# Patient Record
Sex: MALE | Race: WHITE | Employment: OTHER | URBAN - METROPOLITAN AREA
[De-identification: names, ages, dates, MRNs, and addresses within clinical notes are randomized per-mention and may not be internally consistent; named-entity substitution may affect disease eponyms.]

---

## 2017-02-23 ENCOUNTER — HOSPITAL ENCOUNTER (OUTPATIENT)
Facility: HOSPITAL | Age: 82
Setting detail: OBSERVATION
Discharge: HOME HEALTH | End: 2017-02-25
Attending: EMERGENCY MEDICINE | Admitting: HOSPITALIST
Payer: MEDICARE

## 2017-02-23 ENCOUNTER — APPOINTMENT (OUTPATIENT)
Dept: ULTRASOUND IMAGING | Facility: HOSPITAL | Age: 82
End: 2017-02-23
Attending: EMERGENCY MEDICINE
Payer: MEDICARE

## 2017-02-23 ENCOUNTER — APPOINTMENT (OUTPATIENT)
Dept: GENERAL RADIOLOGY | Facility: HOSPITAL | Age: 82
End: 2017-02-23
Attending: EMERGENCY MEDICINE
Payer: MEDICARE

## 2017-02-23 DIAGNOSIS — R50.9 FEVER, UNSPECIFIED FEVER CAUSE: ICD-10-CM

## 2017-02-23 DIAGNOSIS — R53.1 WEAKNESS GENERALIZED: Primary | ICD-10-CM

## 2017-02-23 LAB
ALBUMIN SERPL-MCNC: 3.5 G/DL (ref 3.5–4.8)
ALP LIVER SERPL-CCNC: 95 U/L (ref 45–117)
ALT SERPL-CCNC: 13 U/L (ref 17–63)
AST SERPL-CCNC: 20 U/L (ref 15–41)
ATRIAL RATE: 102 BPM
BASOPHILS # BLD AUTO: 0.03 X10(3) UL (ref 0–0.1)
BASOPHILS NFR BLD AUTO: 0.4 %
BILIRUB SERPL-MCNC: 0.7 MG/DL (ref 0.1–2)
BILIRUB UR QL STRIP.AUTO: NEGATIVE
BUN BLD-MCNC: 27 MG/DL (ref 8–20)
CALCIUM BLD-MCNC: 9.2 MG/DL (ref 8.3–10.3)
CHLORIDE: 109 MMOL/L (ref 101–111)
CLARITY UR REFRACT.AUTO: CLEAR
CO2: 26 MMOL/L (ref 22–32)
COLOR UR AUTO: YELLOW
CREAT BLD-MCNC: 1.85 MG/DL (ref 0.7–1.3)
EOSINOPHIL # BLD AUTO: 0.01 X10(3) UL (ref 0–0.3)
EOSINOPHIL NFR BLD AUTO: 0.1 %
ERYTHROCYTE [DISTWIDTH] IN BLOOD BY AUTOMATED COUNT: 12.8 % (ref 11.5–16)
GLUCOSE BLD-MCNC: 114 MG/DL (ref 70–99)
GLUCOSE UR STRIP.AUTO-MCNC: NEGATIVE MG/DL
HCT VFR BLD AUTO: 41.8 % (ref 37–53)
HGB BLD-MCNC: 14.9 G/DL (ref 13–17)
IMMATURE GRANULOCYTE COUNT: 0.02 X10(3) UL (ref 0–1)
IMMATURE GRANULOCYTE RATIO %: 0.3 %
KETONES UR STRIP.AUTO-MCNC: NEGATIVE MG/DL
LEUKOCYTE ESTERASE UR QL STRIP.AUTO: NEGATIVE
LYMPHOCYTES # BLD AUTO: 0.3 X10(3) UL (ref 0.9–4)
LYMPHOCYTES NFR BLD AUTO: 4.4 %
M PROTEIN MFR SERPL ELPH: 7.5 G/DL (ref 6.1–8.3)
MCH RBC QN AUTO: 30.9 PG (ref 27–33.2)
MCHC RBC AUTO-ENTMCNC: 35.6 G/DL (ref 31–37)
MCV RBC AUTO: 86.7 FL (ref 80–99)
MONOCYTES # BLD AUTO: 0.34 X10(3) UL (ref 0.1–0.6)
MONOCYTES NFR BLD AUTO: 5 %
NEUTROPHIL ABS PRELIM: 6.09 X10 (3) UL (ref 1.3–6.7)
NEUTROPHILS # BLD AUTO: 6.09 X10(3) UL (ref 1.3–6.7)
NEUTROPHILS NFR BLD AUTO: 89.8 %
NITRITE UR QL STRIP.AUTO: NEGATIVE
P-R INTERVAL: 136 MS
PH UR STRIP.AUTO: 5 [PH] (ref 4.5–8)
PLATELET # BLD AUTO: 138 10(3)UL (ref 150–450)
POTASSIUM SERPL-SCNC: 4.6 MMOL/L (ref 3.6–5.1)
PROT UR STRIP.AUTO-MCNC: 30 MG/DL
Q-T INTERVAL: 330 MS
QRS DURATION: 86 MS
QTC CALCULATION (BEZET): 430 MS
R AXIS: -73 DEGREES
RBC # BLD AUTO: 4.82 X10(6)UL (ref 3.8–5.8)
RBC UR QL AUTO: NEGATIVE
RED CELL DISTRIBUTION WIDTH-SD: 39.6 FL (ref 35.1–46.3)
SODIUM SERPL-SCNC: 141 MMOL/L (ref 136–144)
SP GR UR STRIP.AUTO: 1.02 (ref 1–1.03)
T AXIS: 14 DEGREES
UROBILINOGEN UR STRIP.AUTO-MCNC: <2 MG/DL
VENTRICULAR RATE: 102 BPM
WBC # BLD AUTO: 6.8 X10(3) UL (ref 4–13)

## 2017-02-23 PROCEDURE — 93975 VASCULAR STUDY: CPT

## 2017-02-23 PROCEDURE — 71010 XR CHEST AP PORTABLE  (CPT=71010): CPT

## 2017-02-23 PROCEDURE — 76870 US EXAM SCROTUM: CPT

## 2017-02-23 PROCEDURE — 99220 INITIAL OBSERVATION CARE,LEVL III: CPT | Performed by: HOSPITALIST

## 2017-02-23 RX ORDER — AMLODIPINE BESYLATE 2.5 MG/1
2.5 TABLET ORAL DAILY
Status: DISCONTINUED | OUTPATIENT
Start: 2017-02-23 | End: 2017-02-25

## 2017-02-23 RX ORDER — ACETAMINOPHEN 325 MG/1
650 TABLET ORAL EVERY 6 HOURS PRN
Status: DISCONTINUED | OUTPATIENT
Start: 2017-02-23 | End: 2017-02-25

## 2017-02-23 RX ORDER — PRAVASTATIN SODIUM 20 MG
20 TABLET ORAL DAILY
COMMUNITY

## 2017-02-23 RX ORDER — ASPIRIN 325 MG
325 TABLET ORAL DAILY
Status: DISCONTINUED | OUTPATIENT
Start: 2017-02-23 | End: 2017-02-25

## 2017-02-23 RX ORDER — TRAZODONE HYDROCHLORIDE 50 MG/1
50 TABLET ORAL NIGHTLY PRN
Status: DISCONTINUED | OUTPATIENT
Start: 2017-02-23 | End: 2017-02-25

## 2017-02-23 RX ORDER — PRAVASTATIN SODIUM 20 MG
20 TABLET ORAL NIGHTLY
Status: DISCONTINUED | OUTPATIENT
Start: 2017-02-23 | End: 2017-02-25

## 2017-02-23 RX ORDER — HEPARIN SODIUM 5000 [USP'U]/ML
5000 INJECTION, SOLUTION INTRAVENOUS; SUBCUTANEOUS EVERY 8 HOURS
Status: DISCONTINUED | OUTPATIENT
Start: 2017-02-23 | End: 2017-02-25

## 2017-02-23 RX ORDER — ASPIRIN 81 MG/1
81 TABLET, CHEWABLE ORAL DAILY
COMMUNITY

## 2017-02-23 RX ORDER — SODIUM CHLORIDE 9 MG/ML
INJECTION, SOLUTION INTRAVENOUS CONTINUOUS
Status: DISCONTINUED | OUTPATIENT
Start: 2017-02-23 | End: 2017-02-24

## 2017-02-23 RX ORDER — ONDANSETRON 2 MG/ML
8 INJECTION INTRAMUSCULAR; INTRAVENOUS EVERY 6 HOURS PRN
Status: DISCONTINUED | OUTPATIENT
Start: 2017-02-23 | End: 2017-02-25

## 2017-02-23 RX ORDER — MONTELUKAST SODIUM 4 MG/1
1 TABLET, CHEWABLE ORAL DAILY
COMMUNITY
End: 2020-11-04

## 2017-02-23 NOTE — ED NOTES
Pt was able to void while down in US. Urine specimen obtained. Order for straight cath d/c'd at this time.

## 2017-02-23 NOTE — ED NOTES
Pt in [address] at this time and exam has not started. RN to go to [address] department to draw blood at this time in order to expedite care.

## 2017-02-23 NOTE — ED INITIAL ASSESSMENT (HPI)
Patient woke up this morning confused, not knowing what day it was, or his schedule. He has been increasingly fatigued today, unable to get out of chair and transition from wheelchair.  Family states patient can be confused but this is more confusion than

## 2017-02-23 NOTE — H&P
[facility] HOSPITALIST  History and Physical     Jose Chang Patient Status:  Emergency    1926 MRN [id number]   Location [address] [facility] Attending Winsome Mcmillan MD   Hosp Day # 0 PCP Iline Felty, MD     Chief Compl Review of Systems:   A comprehensive 14 point review of systems was completed. Pertinent positives and negatives noted in the HPI.     Physical Exam:    /81 mmHg  Pulse 102  Temp(Src) 100.1 °F (37.8 °C) (Temporal)  Resp 24  Ht 5' 2\" (1.575 baseline  6. Thrombocytopenia-monitor    Will f/u pending studies and provide addendum.     Quality:  · DVT Prophylaxis: heparin  · CODE status: full  · Leo: no    Plan of care discussed with patient and er md Eusebio Centeno MD  2/23/2017    Addendum:

## 2017-02-23 NOTE — ED NOTES
Pt assisted onto cart at this time by RN. Per POA at bedside pt normally has some confusion, and pt has developed a fever and lethargy today. Per POA, pt is normally somewhat difficult to understand with speech.  Pt awake, following simple commands appropri

## 2017-02-23 NOTE — ED PROVIDER NOTES
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 There is a 4.9 cm cyst superior to the left see corresponding to patient's palpable finding. Appropriate clinical followup is advised.     Dictated by: Feng Jnenings MD on 2/23/2017 at 16:13     Approved by: Feng Jennings MD              Chest x-ra

## 2017-02-24 PROCEDURE — 99225 SUBSEQUENT OBSERVATION CARE: CPT | Performed by: HOSPITALIST

## 2017-02-24 NOTE — PROGRESS NOTES
NURSING ADMISSION NOTE      Patient admitted via Cart  Oriented to room. Safety precautions initiated. Bed in low position. Call light in reach. Admission navigator completed.

## 2017-02-24 NOTE — PHYSICAL THERAPY NOTE
PHYSICAL THERAPY EVALUATION - INPATIENT     Room Number: 424/424-A  Evaluation Date: 2/24/2017  Type of Evaluation: Initial  Physician Order: PT Eval and Treat    Presenting Problem: generalized weakness  Reason for Therapy: Mobility Dysfunction and Cathern Shane risk    WEIGHT BEARING RESTRICTION  Weight Bearing Restriction: None                PAIN ASSESSMENT  Ratin          COGNITION  · Overall Cognitive Status:  Impaired  · Orientation Level:  oriented to place, oriented to person and identified hospital, n (G-Code): CK    FUNCTIONAL ABILITY STATUS  Gait Assessment   Gait Assistance: Minimum assistance (Actual CGA)  Distance (ft): 150  Assistive Device: Rolling walker  Pattern: Shuffle  Stoop/Curb Assistance: Not tested    Skilled Therapy Provided: Patient wa mobility; Body mechanics; Patient education; Energy conservation; Endurance; Family education;Gait training;Balance training;Transfer training  Rehab Potential : Good  Frequency (Obs): 5x/week  Number of Visits to Meet Established Goals: 5      CURRENT GOALS

## 2017-02-24 NOTE — CM/SW NOTE
Vannessa Charles, 02/24/2017, 4:35 PM  Pt. Received and signed the Evangelina Rodriguez 130 letter. Copy placed in his chart.

## 2017-02-24 NOTE — PROGRESS NOTES
[facility] H[facility]PITAL[facility]  Progress Note     Juan M Dixon Patient Status:  Observation    1926 MRN [id number]   [facility] 4NW-A Attending Apolonia Oliveira MD   Hosp Day # 1 PCP Jair Sandoval MD     Chief Complaint: Travis Plane: TME  7. CKD    Plan of care: PT eval, await cultures    Quality:  · DVT Prophylaxis: Heparin  · CODE status: Full Code  · Leo: None  · Central line: None    Estimated date of discharge:  Tomorrow  Discharge is dependent on: Progress  At this point Mr. Jose Duran

## 2017-02-24 NOTE — CM/SW NOTE
02/24/17 1600   CM/SW Referral Data   Referral Source Physician;Social Work (self-referral)   Reason for Referral Discharge planning   Informant Other  (POA, Chart)   Pertinent Medical Hx   Primary Care Physician Name Dmitry Enrique   Patient Info   Patient's

## 2017-02-24 NOTE — PLAN OF CARE
Achieve highest/safest level of mobility/gait Progressing     Pt working with physical therapy. Using rolling walker with therapy. Recommending 24/7 care. Pt AOx2. Forgetful. VSS. Adequate appetite.  Pt lives in [facility], taken care of by nurse who wo

## 2017-02-25 VITALS
BODY MASS INDEX: 28.52 KG/M2 | SYSTOLIC BLOOD PRESSURE: 158 MMHG | OXYGEN SATURATION: 95 % | WEIGHT: 155 LBS | DIASTOLIC BLOOD PRESSURE: 66 MMHG | HEIGHT: 62 IN | HEART RATE: 56 BPM | TEMPERATURE: 97 F | RESPIRATION RATE: 20 BRPM

## 2017-02-25 LAB — RESPIRATORY PANEL NEG:: NEGATIVE

## 2017-02-25 PROCEDURE — 99217 OBSERVATION CARE DISCHARGE: CPT | Performed by: HOSPITALIST

## 2017-02-25 NOTE — CM/SW NOTE
MARCO spoke w/BETSEY López who stated he is not sure why the pt is told he needs 24/7 care. POA asked if pt was able to walk. Informed him per pt's note on 2/24 the pt ambulated 150ft. Jc stated the pt's \"been confused. \" Informed him PT/OT will see the pt

## 2017-02-25 NOTE — PLAN OF CARE
Pt is alert and oriented x 2, forgetful most of the times than confusion, Very occasional confusion noted. Walked to the bathroom with assistance. Pt is unsteady, Follows commands appropriately. No C/o pain or discomfort. Vitals stable.  Fall precautions ob

## 2017-02-25 NOTE — CM/SW NOTE
02/25/17 1500   Discharge disposition   Discharged to: Home or Self   Name of Facillity/Home Care/Hospice Residential   Discharge transportation Private car   SW spoke w/POA who stated he would still like to take the pt home.  Jc confirmed nursing st

## 2017-02-25 NOTE — PHYSICAL THERAPY NOTE
PHYSICAL THERAPY TREATMENT NOTE - INPATIENT    Room Number: 424/424-A     Session: 1/5   Number of Visits to Meet Established Goals: 5    Presenting Problem: generalized weakness    Problem List  Principal Problem:    Weakness generalized  Active Problems to a chair (including a wheelchair)?: A Little   -   Need to walk in hospital room?: A Little   -   Climbing 3-5 steps with a railing?: A Lot       AM-PAC Score:  Raw Score: 19   PT Approx Degree of Impairment Score: 41.77%   Standardized Score (AM-PAC Sca when medically appropriate. DISCHARGE RECOMMENDATIONS  PT Discharge Recommendations: 24 hour care/supervision     PLAN  PT Treatment Plan: Bed mobility; Body mechanics; Patient education; Energy conservation; Endurance; Family education;Gait training;Balanc

## 2017-02-25 NOTE — PROGRESS NOTES
Patient is alert,assisted up to bathroom,No complaint of pain. On droplet precautions,awaiting  results of flu panel.

## 2017-02-25 NOTE — OCCUPATIONAL THERAPY NOTE
OCCUPATIONAL THERAPY QUICK EVALUATION - INPATIENT    Room Number: 424/424-A  Evaluation Date: 2/25/2017     Type of Evaluation: Quick Eval  Presenting Problem: weakness    Physician Order: IP Consult to Occupational Therapy  Reason for Therapy:  ADL/IADL D RESTRICTION  Weight Bearing Restriction: None                PAIN ASSESSMENT  Ratin          COGNITION  Per Short Blessed Test, Pt scored 20/28 indicating \"Impairment consistent with dementia\"  Ox2 can follow simple commands for tx    RANGE OF MOTION Per Short Blessed Test, Pt scored 20/28 indicating \"Impairment consistent with dementia\"    OT Discharge Recommendations: 24 hour care/supervision  OT Device Recommendations: None    PLAN  Patient has been evaluated and presents with no skilled Occupatio

## 2017-02-25 NOTE — PROGRESS NOTES
[facility] H[facility]PITAL[facility]  Progress Note     [facility] Patient Status:  Observation    1926 MRN [id number]   [facility] 4NW-A Attending Areli Yung MD   Hosp Day # 2 PCP Juan M Ríos MD     Chief Complaint: Barbie Bare: up  4. HTN  5. HL  6. Acute encephalopathy:  Stable  7. CKD    Plan of care: Placement    Quality:  · DVT Prophylaxis: Heparin  · CODE status: Full Code  · Leo: None  · Central line: None    Estimated date of discharge:  Today  Discharge is dependent on:

## 2017-02-26 NOTE — DISCHARGE SUMMARY
[facility] HOSPITALIST  DISCHARGE SUMMARY     [facility] Patient Status:  Observation    1926 MRN [id number]   [facility] 4NW-A Attending No att. providers found   Hosp Day # 2 PCP Duong Mack MD     Date of Admission:  CONTINUE taking these medications       Instructions Prescription details    aspirin 325 MG Tabs   Last time this was given:  325 mg on 2/25/2017 10:06 AM        Take 325 mg by mouth daily.     Refills:  0       Colestipol HCl 1 g Tabs   Commonly known as

## 2017-02-26 NOTE — PLAN OF CARE
NURSING DISCHARGE NOTE    Discharged Home-holly home via Wheelchair. Accompanied by Support staff  Belongings Taken by patient/family. Discharge instructions given to [address] [place] [address].

## 2017-02-27 ENCOUNTER — TELEPHONE (OUTPATIENT)
Dept: FAMILY MEDICINE CLINIC | Facility: CLINIC | Age: 82
End: 2017-02-27

## 2017-03-02 ENCOUNTER — TELEPHONE (OUTPATIENT)
Dept: FAMILY MEDICINE CLINIC | Facility: CLINIC | Age: 82
End: 2017-03-02

## 2017-03-02 NOTE — TELEPHONE ENCOUNTER
Filomena from [employer] called giving an update on pt. He was a non admit after hospitalization. After evaluation they did not feel that home healthcare was needed at this time.

## 2017-08-03 ENCOUNTER — TELEPHONE (OUTPATIENT)
Dept: FAMILY MEDICINE CLINIC | Facility: CLINIC | Age: 82
End: 2017-08-03

## 2017-08-07 ENCOUNTER — PRIOR ORIGINAL RECORDS (OUTPATIENT)
Dept: OTHER | Age: 82
End: 2017-08-07

## 2018-01-10 ENCOUNTER — PRIOR ORIGINAL RECORDS (OUTPATIENT)
Dept: OTHER | Age: 83
End: 2018-01-10

## 2018-01-26 ENCOUNTER — PRIOR ORIGINAL RECORDS (OUTPATIENT)
Dept: OTHER | Age: 83
End: 2018-01-26

## 2018-01-29 LAB
ALBUMIN: 3.7 G/DL
ALKALINE PHOSPHATATE(ALK PHOS): 81 IU/L
BILIRUBIN TOTAL: 0.5 MG/DL
BUN: 33 MG/DL
CALCIUM: 9.4 MG/DL
CHLORIDE: 111 MEQ/L
CHOLESTEROL, TOTAL: 156 MG/DL
CREATININE, SERUM: 1.78 MG/DL
GLOBULIN: 2.8 G/DL
GLUCOSE: 98 MG/DL
HDL CHOLESTEROL: 47 MG/DL
HEMATOCRIT: 35.6 %
HEMOGLOBIN: 11.9 G/DL
LDL CHOLESTEROL: 83 MG/DL
PLATELETS: 145 K/UL
POTASSIUM, SERUM: 4.6 MEQ/L
PROTEIN, TOTAL: 6.5 G/DL
RED BLOOD COUNT: 4.02 X 10-6/U
SGOT (AST): 19 IU/L
SGPT (ALT): 7 IU/L
SODIUM: 143 MEQ/L
THYROID STIMULATING HORMONE: 3.72 MLU/L
TRIGLYCERIDES: 156 MG/DL
WHITE BLOOD COUNT: 5.2 X 10-3/U

## 2018-04-30 ENCOUNTER — PRIOR ORIGINAL RECORDS (OUTPATIENT)
Dept: OTHER | Age: 83
End: 2018-04-30

## 2018-11-12 ENCOUNTER — MYAURORA ACCOUNT LINK (OUTPATIENT)
Dept: OTHER | Age: 83
End: 2018-11-12

## 2018-11-12 ENCOUNTER — PRIOR ORIGINAL RECORDS (OUTPATIENT)
Dept: OTHER | Age: 83
End: 2018-11-12

## 2019-02-07 ENCOUNTER — PRIOR ORIGINAL RECORDS (OUTPATIENT)
Dept: OTHER | Age: 84
End: 2019-02-07

## 2019-02-08 LAB
CHOLESTEROL, TOTAL: 163 MG/DL
HDL CHOLESTEROL: 49 MG/DL
LDL CHOLESTEROL: 89 MG/DL
TRIGLYCERIDES: 148 MG/DL

## 2019-02-12 ENCOUNTER — PRIOR ORIGINAL RECORDS (OUTPATIENT)
Dept: OTHER | Age: 84
End: 2019-02-12

## 2019-02-13 ENCOUNTER — PRIOR ORIGINAL RECORDS (OUTPATIENT)
Dept: OTHER | Age: 84
End: 2019-02-13

## 2019-02-28 VITALS
DIASTOLIC BLOOD PRESSURE: 60 MMHG | HEART RATE: 57 BPM | SYSTOLIC BLOOD PRESSURE: 128 MMHG | BODY MASS INDEX: 22.26 KG/M2 | WEIGHT: 121 LBS | HEIGHT: 62 IN

## 2019-02-28 VITALS
HEIGHT: 62 IN | WEIGHT: 129 LBS | BODY MASS INDEX: 23.74 KG/M2 | HEART RATE: 58 BPM | DIASTOLIC BLOOD PRESSURE: 62 MMHG | SYSTOLIC BLOOD PRESSURE: 120 MMHG

## 2019-02-28 VITALS
HEIGHT: 62 IN | SYSTOLIC BLOOD PRESSURE: 126 MMHG | BODY MASS INDEX: 21.35 KG/M2 | DIASTOLIC BLOOD PRESSURE: 60 MMHG | WEIGHT: 116 LBS | HEART RATE: 60 BPM

## 2019-03-27 RX ORDER — AMLODIPINE BESYLATE 2.5 MG/1
1 TABLET ORAL DAILY
COMMUNITY
Start: 2018-11-09 | End: 2019-11-26 | Stop reason: SDUPTHER

## 2019-03-27 RX ORDER — PRAVASTATIN SODIUM 20 MG
TABLET ORAL
COMMUNITY
End: 2020-02-12 | Stop reason: SDUPTHER

## 2019-03-27 RX ORDER — ASPIRIN 325 MG
TABLET ORAL
COMMUNITY

## 2019-03-27 RX ORDER — MONTELUKAST SODIUM 4 MG/1
TABLET, CHEWABLE ORAL
COMMUNITY
Start: 2018-11-09 | End: 2019-05-07 | Stop reason: ALTCHOICE

## 2019-05-03 ENCOUNTER — TELEPHONE (OUTPATIENT)
Dept: CARDIOLOGY | Age: 84
End: 2019-05-03

## 2019-05-06 ENCOUNTER — TELEPHONE (OUTPATIENT)
Dept: CARDIOLOGY | Age: 84
End: 2019-05-06

## 2019-05-20 ENCOUNTER — OFFICE VISIT (OUTPATIENT)
Dept: CARDIOLOGY | Age: 84
End: 2019-05-20

## 2019-05-20 VITALS
DIASTOLIC BLOOD PRESSURE: 60 MMHG | WEIGHT: 118 LBS | HEIGHT: 60 IN | SYSTOLIC BLOOD PRESSURE: 128 MMHG | BODY MASS INDEX: 23.16 KG/M2 | HEART RATE: 62 BPM

## 2019-05-20 DIAGNOSIS — I25.10 CORONARY ARTERY DISEASE INVOLVING NATIVE CORONARY ARTERY OF NATIVE HEART WITHOUT ANGINA PECTORIS: Primary | ICD-10-CM

## 2019-05-20 DIAGNOSIS — Z95.1 HX OF CABG: ICD-10-CM

## 2019-05-20 DIAGNOSIS — E78.00 PURE HYPERCHOLESTEROLEMIA: ICD-10-CM

## 2019-05-20 DIAGNOSIS — I10 HYPERTENSION, BENIGN: ICD-10-CM

## 2019-05-20 DIAGNOSIS — I65.23 ASYMPTOMATIC CAROTID ARTERY STENOSIS, BILATERAL: ICD-10-CM

## 2019-05-20 PROCEDURE — 99214 OFFICE O/P EST MOD 30 MIN: CPT | Performed by: INTERNAL MEDICINE

## 2019-05-20 SDOH — HEALTH STABILITY: MENTAL HEALTH: HOW OFTEN DO YOU HAVE A DRINK CONTAINING ALCOHOL?: NEVER

## 2019-11-04 ENCOUNTER — DOCUMENTATION (OUTPATIENT)
Dept: CARDIOLOGY | Age: 84
End: 2019-11-04

## 2019-11-05 ENCOUNTER — DOCUMENTATION (OUTPATIENT)
Dept: CARDIOLOGY | Age: 84
End: 2019-11-05

## 2019-11-25 ENCOUNTER — OFFICE VISIT (OUTPATIENT)
Dept: CARDIOLOGY | Age: 84
End: 2019-11-25

## 2019-11-25 VITALS
HEIGHT: 60 IN | BODY MASS INDEX: 22.78 KG/M2 | DIASTOLIC BLOOD PRESSURE: 80 MMHG | SYSTOLIC BLOOD PRESSURE: 132 MMHG | WEIGHT: 116 LBS | HEART RATE: 70 BPM

## 2019-11-25 DIAGNOSIS — I65.23 ASYMPTOMATIC CAROTID ARTERY STENOSIS, BILATERAL: ICD-10-CM

## 2019-11-25 DIAGNOSIS — Z95.1 HX OF CABG: ICD-10-CM

## 2019-11-25 DIAGNOSIS — I25.10 CORONARY ARTERY DISEASE INVOLVING NATIVE CORONARY ARTERY OF NATIVE HEART WITHOUT ANGINA PECTORIS: Primary | ICD-10-CM

## 2019-11-25 DIAGNOSIS — E78.00 PURE HYPERCHOLESTEROLEMIA: ICD-10-CM

## 2019-11-25 DIAGNOSIS — I10 HYPERTENSION, BENIGN: ICD-10-CM

## 2019-11-25 PROCEDURE — 99214 OFFICE O/P EST MOD 30 MIN: CPT | Performed by: INTERNAL MEDICINE

## 2019-11-25 SDOH — HEALTH STABILITY: PHYSICAL HEALTH: ON AVERAGE, HOW MANY DAYS PER WEEK DO YOU ENGAGE IN MODERATE TO STRENUOUS EXERCISE (LIKE A BRISK WALK)?: 0 DAYS

## 2019-11-25 SDOH — HEALTH STABILITY: PHYSICAL HEALTH: ON AVERAGE, HOW MANY MINUTES DO YOU ENGAGE IN EXERCISE AT THIS LEVEL?: 0 MIN

## 2019-11-25 SDOH — HEALTH STABILITY: MENTAL HEALTH: HOW OFTEN DO YOU HAVE A DRINK CONTAINING ALCOHOL?: NEVER

## 2019-11-25 ASSESSMENT — PATIENT HEALTH QUESTIONNAIRE - PHQ9
1. LITTLE INTEREST OR PLEASURE IN DOING THINGS: NOT AT ALL
2. FEELING DOWN, DEPRESSED OR HOPELESS: NOT AT ALL
SUM OF ALL RESPONSES TO PHQ9 QUESTIONS 1 AND 2: 0
SUM OF ALL RESPONSES TO PHQ9 QUESTIONS 1 AND 2: 0

## 2019-11-26 RX ORDER — AMLODIPINE BESYLATE 2.5 MG/1
2.5 TABLET ORAL DAILY
Qty: 90 TABLET | Refills: 3 | Status: SHIPPED | OUTPATIENT
Start: 2019-11-26 | End: 2019-11-29 | Stop reason: SDUPTHER

## 2019-11-29 RX ORDER — AMLODIPINE BESYLATE 2.5 MG/1
TABLET ORAL
Qty: 90 TABLET | Refills: 1 | Status: SHIPPED | OUTPATIENT
Start: 2019-11-29

## 2020-02-13 RX ORDER — PRAVASTATIN SODIUM 20 MG
TABLET ORAL
Qty: 90 TABLET | Refills: 3 | Status: SHIPPED | OUTPATIENT
Start: 2020-02-13

## 2020-02-15 LAB
ALBUMIN SERPL-MCNC: 4 G/DL (ref 3.6–5.1)
ALBUMIN/GLOB SERPL: 1.4 (CALC) (ref 1–2.5)
ALP SERPL-CCNC: 68 U/L (ref 35–144)
ALT SERPL-CCNC: 9 U/L (ref 9–46)
AST SERPL-CCNC: 25 U/L (ref 10–35)
BASOPHILS # BLD AUTO: 38 CELLS/UL (ref 0–200)
BASOPHILS NFR BLD AUTO: 0.8 %
BILIRUB SERPL-MCNC: 0.6 MG/DL (ref 0.2–1.2)
BUN SERPL-MCNC: 40 MG/DL (ref 7–25)
BUN/CREAT SERPL: 23 (CALC) (ref 6–22)
CALCIUM SERPL-MCNC: 9.7 MG/DL (ref 8.6–10.3)
CHLORIDE SERPL-SCNC: 107 MMOL/L (ref 98–110)
CHOLEST SERPL-MCNC: 159 MG/DL
CHOLEST/HDLC SERPL: 2.9 (CALC)
CO2 SERPL-SCNC: 28 MMOL/L (ref 20–32)
CREAT SERPL-MCNC: 1.77 MG/DL (ref 0.7–1.11)
EOSINOPHIL # BLD AUTO: 168 CELLS/UL (ref 15–500)
EOSINOPHIL NFR BLD AUTO: 3.5 %
ERYTHROCYTE [DISTWIDTH] IN BLOOD BY AUTOMATED COUNT: 13.5 % (ref 11–15)
GFRSERPLBLD MDRD-ARVRAT: 32 ML/MIN/1.73M2
GLOBULIN SER CALC-MCNC: 2.9 G/DL (CALC) (ref 1.9–3.7)
GLUCOSE SERPL-MCNC: 89 MG/DL (ref 65–99)
HCT VFR BLD AUTO: 36.6 % (ref 38.5–50)
HDLC SERPL-MCNC: 55 MG/DL
HGB BLD-MCNC: 12.5 G/DL (ref 13.2–17.1)
LDLC SERPL CALC-MCNC: 85 MG/DL (CALC)
LYMPHOCYTES # BLD AUTO: 1138 CELLS/UL (ref 850–3900)
LYMPHOCYTES NFR BLD AUTO: 23.7 %
MCH RBC QN AUTO: 30.9 PG (ref 27–33)
MCHC RBC AUTO-ENTMCNC: 34.2 G/DL (ref 32–36)
MCV RBC AUTO: 90.6 FL (ref 80–100)
MONOCYTES # BLD AUTO: 456 CELLS/UL (ref 200–950)
MONOCYTES NFR BLD AUTO: 9.5 %
NEUTROPHILS # BLD AUTO: 3000 CELLS/UL (ref 1500–7800)
NEUTROPHILS NFR BLD AUTO: 62.5 %
NONHDLC SERPL-MCNC: 104 MG/DL (CALC)
PLATELET # BLD AUTO: 155 THOUSAND/UL (ref 140–400)
PMV BLD REES-ECKER: 10 FL (ref 7.5–12.5)
POTASSIUM SERPL-SCNC: 4.8 MMOL/L (ref 3.5–5.3)
PROT SERPL-MCNC: 6.9 G/DL (ref 6.1–8.1)
RBC # BLD AUTO: 4.04 MILLION/UL (ref 4.2–5.8)
SODIUM SERPL-SCNC: 143 MMOL/L (ref 135–146)
TRIGL SERPL-MCNC: 98 MG/DL
WBC # BLD AUTO: 4.8 THOUSAND/UL (ref 3.8–10.8)

## 2020-04-03 ENCOUNTER — DOCUMENTATION (OUTPATIENT)
Dept: CARDIOLOGY | Age: 85
End: 2020-04-03

## 2020-07-25 ENCOUNTER — HOSPITAL ENCOUNTER (OUTPATIENT)
Age: 85
Discharge: HOME | End: 2020-07-25
Payer: MEDICARE

## 2020-07-25 VITALS
TEMPERATURE: 98 F | BODY MASS INDEX: 28 KG/M2 | DIASTOLIC BLOOD PRESSURE: 72 MMHG | OXYGEN SATURATION: 98 % | WEIGHT: 155 LBS | HEART RATE: 84 BPM | RESPIRATION RATE: 16 BRPM | SYSTOLIC BLOOD PRESSURE: 126 MMHG

## 2020-07-25 DIAGNOSIS — Z20.822 ENCOUNTER FOR SCREENING LABORATORY TESTING FOR COVID-19 VIRUS IN ASYMPTOMATIC PATIENT: Primary | ICD-10-CM

## 2020-07-25 PROCEDURE — 99202 OFFICE O/P NEW SF 15 MIN: CPT | Performed by: PHYSICIAN ASSISTANT

## 2020-07-25 NOTE — ED PROVIDER NOTES
Patient Seen in: [address]      History   Patient presents with:  Testing: Covid 19    Stated Complaint: covid test    HPI    40-year-old male presents to the clinic for a COVID test.  Per caretaker, this is a prerequisite distress. Appearance: Normal appearance. He is well-developed. He is not ill-appearing. HENT:      Head: Atraumatic.       Right Ear: External ear normal.      Left Ear: External ear normal.   Eyes:      Conjunctiva/sclera: Conjunctivae normal.   Neck

## 2020-07-28 LAB — SARS-COV-2 RNA RESP QL NAA+PROBE: NOT DETECTED

## 2020-11-04 ENCOUNTER — APPOINTMENT (OUTPATIENT)
Dept: CT IMAGING | Facility: HOSPITAL | Age: 85
DRG: 178 | End: 2020-11-04
Attending: EMERGENCY MEDICINE
Payer: MEDICARE

## 2020-11-04 ENCOUNTER — HOSPITAL ENCOUNTER (INPATIENT)
Facility: HOSPITAL | Age: 85
LOS: 4 days | Discharge: NURSING, REHAB OR LONG-TERM CARE | DRG: 178 | End: 2020-11-09
Attending: EMERGENCY MEDICINE | Admitting: INTERNAL MEDICINE
Payer: MEDICARE

## 2020-11-04 ENCOUNTER — APPOINTMENT (OUTPATIENT)
Dept: GENERAL RADIOLOGY | Facility: HOSPITAL | Age: 85
DRG: 178 | End: 2020-11-04
Attending: EMERGENCY MEDICINE
Payer: MEDICARE

## 2020-11-04 DIAGNOSIS — R11.10 VOMITING, INTRACTABILITY OF VOMITING NOT SPECIFIED, PRESENCE OF NAUSEA NOT SPECIFIED, UNSPECIFIED VOMITING TYPE: Primary | ICD-10-CM

## 2020-11-04 DIAGNOSIS — J18.9 PNEUMONIA DUE TO INFECTIOUS ORGANISM, UNSPECIFIED LATERALITY, UNSPECIFIED PART OF LUNG: ICD-10-CM

## 2020-11-04 PROBLEM — D64.9 ANEMIA: Status: ACTIVE | Noted: 2020-11-04

## 2020-11-04 PROCEDURE — 71045 X-RAY EXAM CHEST 1 VIEW: CPT | Performed by: EMERGENCY MEDICINE

## 2020-11-04 PROCEDURE — 74176 CT ABD & PELVIS W/O CONTRAST: CPT | Performed by: EMERGENCY MEDICINE

## 2020-11-04 PROCEDURE — 99223 1ST HOSP IP/OBS HIGH 75: CPT | Performed by: INTERNAL MEDICINE

## 2020-11-04 RX ORDER — ONDANSETRON 2 MG/ML
4 INJECTION INTRAMUSCULAR; INTRAVENOUS EVERY 6 HOURS PRN
Status: DISCONTINUED | OUTPATIENT
Start: 2020-11-04 | End: 2020-11-09

## 2020-11-04 RX ORDER — SODIUM CHLORIDE 9 MG/ML
INJECTION, SOLUTION INTRAVENOUS CONTINUOUS
Status: DISCONTINUED | OUTPATIENT
Start: 2020-11-04 | End: 2020-11-05

## 2020-11-04 RX ORDER — SENNOSIDES 8.6 MG
650 CAPSULE ORAL EVERY 6 HOURS PRN
COMMUNITY

## 2020-11-04 RX ORDER — SODIUM CHLORIDE 450 MG/100ML
INJECTION, SOLUTION INTRAVENOUS CONTINUOUS
Status: DISCONTINUED | OUTPATIENT
Start: 2020-11-04 | End: 2020-11-05

## 2020-11-04 RX ORDER — SENNOSIDES 8.6 MG
650 CAPSULE ORAL NIGHTLY
COMMUNITY
End: 2021-01-21

## 2020-11-04 RX ORDER — MINERAL OIL 100 G/100G
1 OIL RECTAL EVERY 8 HOURS
Status: DISCONTINUED | OUTPATIENT
Start: 2020-11-04 | End: 2020-11-07

## 2020-11-04 RX ORDER — POLYETHYLENE GLYCOL 3350 17 G/17G
17 POWDER, FOR SOLUTION ORAL 2 TIMES DAILY
Status: DISCONTINUED | OUTPATIENT
Start: 2020-11-04 | End: 2020-11-09

## 2020-11-04 RX ORDER — PRAVASTATIN SODIUM 20 MG
20 TABLET ORAL NIGHTLY
Status: DISCONTINUED | OUTPATIENT
Start: 2020-11-04 | End: 2020-11-09

## 2020-11-04 RX ORDER — MULTIVIT-MIN/IRON FUM/FOLIC AC 7.5 MG-4
1 TABLET ORAL DAILY
COMMUNITY

## 2020-11-04 RX ORDER — LABETALOL HYDROCHLORIDE 5 MG/ML
20 INJECTION, SOLUTION INTRAVENOUS 4 TIMES DAILY PRN
Status: DISCONTINUED | OUTPATIENT
Start: 2020-11-04 | End: 2020-11-09

## 2020-11-04 RX ORDER — AMLODIPINE BESYLATE 2.5 MG/1
2.5 TABLET ORAL DAILY
Status: DISCONTINUED | OUTPATIENT
Start: 2020-11-04 | End: 2020-11-04

## 2020-11-04 RX ORDER — SENNOSIDES 8.6 MG
8.6 TABLET ORAL 2 TIMES DAILY PRN
COMMUNITY

## 2020-11-04 RX ORDER — VANCOMYCIN/0.9 % SOD CHLORIDE 1.75 G/5
25 PLASTIC BAG, INJECTION (ML) INTRAVENOUS ONCE
Status: COMPLETED | OUTPATIENT
Start: 2020-11-04 | End: 2020-11-04

## 2020-11-04 RX ORDER — ACETAMINOPHEN 325 MG/1
650 TABLET ORAL EVERY 6 HOURS PRN
Status: DISCONTINUED | OUTPATIENT
Start: 2020-11-04 | End: 2020-11-09

## 2020-11-04 RX ORDER — POLYETHYLENE GLYCOL 3350 17 G/17G
17 POWDER, FOR SOLUTION ORAL DAILY PRN
COMMUNITY

## 2020-11-04 NOTE — ED INITIAL ASSESSMENT (HPI)
From [facility]. ..vomiting since yesterday.  epistaxis today.  vital signs stable.  most recent COVID Monday was negative.

## 2020-11-04 NOTE — PLAN OF CARE
Seen & evaluated by Dr Mika Ruiz. Patient will be monitored & will be treated emperically,no planned procedures at this time. Patient can proceed w/ regular diet.

## 2020-11-04 NOTE — CONSULTS
[facility]                       Gastroenterology Consultation-[facility]    Vidal Doan [id number] Patient Status:  Emergency    1926 MRN [id number]   Location [address] [facility] [address] Attending Harrison Lizarraga MD   [facility] Day of smoking; No EtOH intake; The patient has no history of IV drug use or other illicit substances. FamHx: The patient has no family history of colon cancer or other gastrointestinal malignancies;   No family history of ulcer disease, or inflammatory bowel congruent affect without obvious depression or anxiety  Labs:   Lab Results   Component Value Date    WBC 6.9 11/04/2020    HGB 10.5 11/04/2020    HCT 31.8 11/04/2020    .0 11/04/2020    CREATSERUM 2.01 11/04/2020    BUN 39 11/04/2020     11/0 LIVER:  No enlargement, atrophy, abnormal density, or significant focal lesion. BILIARY:  There is cholelithiasis. There is no CT evidence of cholecystitis. PANCREAS:  No lesion, fluid collection, ductal dilatation, or atrophy.     SPLEEN:  No enlarg develops     Thank you for the consultation, we will follow the patient with you.   Sigrid Rasmussen, APRN  1:52 PM  11/4/2020  [facility] Gastroenterology  [phone]    GI attending addendum:    I have personally seen and examined this patient and agree wit

## 2020-11-04 NOTE — ED PROVIDER NOTES
Patient Seen in: [facility] Emergency Department      History   No chief complaint on file.     Stated Complaint: NVD    HPI    This is a 79-year-old man, history of CAD status post CABG, here for evaluation of multiple episodes of vomiting, reported Exam  Vitals signs and nursing note reviewed. General: Elderly man laying in the bed no acute distress  Head: Normocephalic and atraumatic. HEENT:  Mucous membranes are moist.   Cardiovascular:  Normal rate and regular rhythm.   No Edema  Pulmonary:  Pu axes as noted on EKG Report. Rate: Normal sinus rhythm  Rhythm: Sinus Rhythm  Reading:  There is T wave inversion in V2 V3, nonspecific changes, no significant change from previous           Ct Abdomen+pelvis Kidneystone 2d Rndr(no Iv,no Oral)(cpt=74176) enlargement of prostate. There is marked distention of the urinary bladder. LUNG BASES:  Dependent consolidation in lower lobes is probably dependent atelectasis. OTHER:  Negative. CONCLUSION:  1.  There is moderate right hydronephrosis althoug MD on 11/04/2020 at 2:40 PM     Finalized by (CST): Lizz Ahumada MD on 11/04/2020 at 2:42 PM               MDM      80year-old man here with reported episodes of vomiting, also with cough.   Has not vomited since his arrival to the emergency department, malik

## 2020-11-05 ENCOUNTER — APPOINTMENT (OUTPATIENT)
Dept: CV DIAGNOSTICS | Facility: HOSPITAL | Age: 85
DRG: 178 | End: 2020-11-05
Attending: INTERNAL MEDICINE
Payer: MEDICARE

## 2020-11-05 PROCEDURE — 99233 SBSQ HOSP IP/OBS HIGH 50: CPT | Performed by: INTERNAL MEDICINE

## 2020-11-05 PROCEDURE — 93306 TTE W/DOPPLER COMPLETE: CPT | Performed by: INTERNAL MEDICINE

## 2020-11-05 RX ORDER — TAMSULOSIN HYDROCHLORIDE 0.4 MG/1
0.4 CAPSULE ORAL DAILY
Status: DISCONTINUED | OUTPATIENT
Start: 2020-11-05 | End: 2020-11-09

## 2020-11-05 RX ORDER — FINASTERIDE 5 MG/1
5 TABLET, FILM COATED ORAL DAILY
Qty: 90 TABLET | Refills: 5 | Status: SHIPPED | OUTPATIENT
Start: 2020-11-05 | End: 2020-11-09

## 2020-11-05 RX ORDER — SILODOSIN 8 MG/1
8 CAPSULE ORAL EVERY EVENING
Qty: 90 CAPSULE | Refills: 5 | Status: SHIPPED | OUTPATIENT
Start: 2020-11-05 | End: 2020-11-09

## 2020-11-05 RX ORDER — HEPARIN SODIUM 5000 [USP'U]/ML
5000 INJECTION, SOLUTION INTRAVENOUS; SUBCUTANEOUS EVERY 12 HOURS SCHEDULED
Status: DISCONTINUED | OUTPATIENT
Start: 2020-11-05 | End: 2020-11-09

## 2020-11-05 RX ORDER — PANTOPRAZOLE SODIUM 40 MG/1
40 TABLET, DELAYED RELEASE ORAL
Status: DISCONTINUED | OUTPATIENT
Start: 2020-11-06 | End: 2020-11-09

## 2020-11-05 RX ORDER — FINASTERIDE 5 MG/1
5 TABLET, FILM COATED ORAL DAILY
Status: DISCONTINUED | OUTPATIENT
Start: 2020-11-05 | End: 2020-11-09

## 2020-11-05 RX ORDER — ALBUTEROL SULFATE 2.5 MG/3ML
2.5 SOLUTION RESPIRATORY (INHALATION)
Status: DISCONTINUED | OUTPATIENT
Start: 2020-11-05 | End: 2020-11-06

## 2020-11-05 NOTE — CM/SW NOTE
Patient discussed at care rounds and reported to have been admitted from [facility]. Clinical updates sent to facility from [address] [place] [address]. SW will follow and will need to confirm return with pt/family.      [address]  B:[phone]  H:[phone]

## 2020-11-05 NOTE — PLAN OF CARE
Awake & alert,up sitting in the chair. Not in any form of distress. Occasional coughing note,unable to cough out phlegm. Patient was downgraged to pureed NTL after seen by speech. Aspirato precautions maintained. Patient received a dose of zoyn & vanco IV in Use soft bristle tooth brush  - Limit straining and forceful nose blowing  Outcome: Progressing     Problem: Impaired Swallowing  Goal: Minimize aspiration risk  Description: Interventions:  - Patient should be alert and upright for all feedings (90 degree

## 2020-11-05 NOTE — PLAN OF CARE
Patient was noted lying on the floor on his back against the fall matts. Remains conscious & alert,no injuries noted. Able to move extremities w/ no difficulty as before. Bed was on its lowest position, Bed rails were up for safety.  Informed Dr Mary Lin & he t

## 2020-11-05 NOTE — PLAN OF CARE
Pt alert to self. RA. VSS. Afebrile. Denies pain. Leo inserted per order. 700 ml output. UA sent, negative. Sacrum area reddened, intact. No open sores. Pt repositioned as needed. Pt NPO. IVF infusing. Hgb 9.2. No signs of active bleeding.    Ronald Mode

## 2020-11-05 NOTE — DIETARY NOTE
MARINE Otto 81     Admitting diagnosis:  Pneumonia due to infectious organism, unspecified laterality, unspecified part of lung [J18.9]  Vomiting, intractability of vomiting not specified, presence of nausea not

## 2020-11-05 NOTE — H&P
[facility]    History & Physical    Kierra Moyer Patient Status:  Observation    1926 MRN [id number]   [facility] 4NW-A Attending Anderson Pacheco MD   Hosp Day # 0 PCP Joselyn Bryson MD     Date:  2020  Date of Admission: No    Allergies/Medications: Allergies:   Toprol Xl [Metoprol*        Comment:XL TB24; lung congestion    •  Acetaminophen ER (ACETAMINOPHEN 8 HOUR) 650 MG Oral Tab CR, Take 650 mg by mouth nightly.     •  Multiple Vitamins-Minerals (MULTI-VITAMIN/MINERAL Neurologic :                                  General weakness, no focal deficit,                                            Results:     Lab Results   Component Value Date    WBC 6.7 11/05/2020    HGB 10.1 (L) 11/05/2020    HCT 30.1 (L) 11/05/2020    PL Date: 11/4/2020  Ectopic atrial rhythm Low voltage QRS, consider pulmonary disease, pericardial effusion, or normal variant Left anterior fascicular block T wave abnormality, consider anterior ischemia Abnormal ECG When compared with ECG of 23-FEB-2017 15: WALL:  There are fat containing bilateral inguinal hernias. BONES:  There is advanced degenerative change in the lumbar spine. PELVIC ORGANS:  There is marked enlargement of prostate. There is marked distention of the urinary bladder.  LUNG BASES:  Depende present within the lower lobes. Areas of atelectasis or pneumonia are within the differential.  A follow-up exam is recommended.     Dictated by (CST): Chaim Gonzalez MD on 11/04/2020 at 2:40 PM     Finalized by (CST): Chaim Gonzalez MD on 11/04/2020 at 2:42 PM containing inguinal hernias    Anemia  Anemia of chronic disease, rule out GI bleed      Vomiting  Due to constipation/large fecal retention in colon, laxatives, MiraLAX, hydration, when necessary Zofran      Pneumonia due to infectious organism, unspecifi

## 2020-11-05 NOTE — PROGRESS NOTES
[facility]  Nephrology Progress Note    Opal Obando Attending:  Rena Palacios MD       Assessment and Plan:    1) EDMUND- primarily due to urinary retention / WIN[place] with hydronephrosis. Meds benign; UA bland; no NSAIDS / contrast studies.  Does not a 4.8 11/04/2020     11/04/2020    CO2 23.0 11/04/2020     11/04/2020    CA 9.1 11/04/2020    ALB 3.0 11/04/2020    ALKPHO 80 11/04/2020    BILT 0.5 11/04/2020    TP 7.2 11/04/2020    AST 34 11/04/2020    ALT 18 11/04/2020    INR 1.11 11/04/2020

## 2020-11-05 NOTE — PLAN OF CARE
Return to AL with PT  Problem: Impaired Functional Mobility  Goal: Achieve highest/safest level of mobility/gait  Description: Interventions:  - Assess patient's functional ability and stability  - Promote increasing activity/tolerance for mobility and gai

## 2020-11-05 NOTE — PROGRESS NOTES
[facility] Gastroenterology Progress Note    CC: Coffee ground emesis     S: Denies nausea, vomiting, abdominal pain     O: BP (!) 164/63 (BP Location: Left arm)   Pulse 82   Temp 97.5 °F (36.4 °C) (Oral)   Resp 18   Wt 138 lb 9.6 oz (62.9 kg)   SpO2

## 2020-11-05 NOTE — CONSULTS
[facility]    Urology Consult Note    Michelle Gallardo Patient Status:  Observation    1926 MRN [id number]   [facility] 4NW-A Attending Nancy Marmolejo MD   Hosp Day # 0 PCP Acacia Davenport MD     Date of Admission:  2020  Date surgery testis      Family History   Problem Relation Age of Onset   • Cancer Father         laryngeal/throat   • Stroke Mother         stroke syndrome   • Other (Other) Brother         cutaneous gangrene      Social History: Social History    Tobacco Use Results (last three years):  Lab Results   Component Value Date    URINECUL <10,000 CFU/ML Mixed gram positive lilly 02/23/2017         Impression:     Patient is a 80year old male with a PMH of dementia, HTN, CAD, CKD, GIB, BPH.    Admitted with PNA, mult

## 2020-11-05 NOTE — CONSULTS
[facility] Note  [facility]  Report of Consultation    Adela Burrows Patient Status:  Observation    1926 MRN [id number]   [facility] 4NW-A Attending Courtney Norton MD   [facility] Day # Sodium  20 mg Oral Nightly   • pantoprazole (PROTONIX) IV push  40 mg Intravenous Q12H   • PEG 3350  17 g Oral BID   • mineral oil  1 enema Rectal Q8H     ondansetron HCl, Labetalol HCl, acetaminophen    Review of Systems:   Constitutional: Negative for an HNWDW@         PHYSICAL EXAM  GEN: Appears very pleasant.  No acute distress  PSYCH: Normal mood and affect, AAOX3  NEURO: CN 2-12 grossly intact, no focal deficits  HEENT: Atraumatic, normocephalic, EOMI, no icterus/hemorrhage, no conjunctival injection/di 11/04/20  2231   COLORUR Yellow   CLARITY Clear   SPECGRAVITY 1.013   GLUUR Negative   BILUR Negative   KETUR Negative   BLOODURINE Negative   PHURINE 6.0   PROUR Negative   UROBILINOGEN <2.0   NITRITE Negative   LEUUR Negative       Radiology:     Ct Jarad Hernandez

## 2020-11-05 NOTE — SLP NOTE
ADULT SWALLOWING EVALUATION    ASSESSMENT    ASSESSMENT/OVERALL IMPRESSION:  Order received for BSE to r/o aspiration per RN dysphagia screen. Pt admitted from [facility] due to vomiting and epistaxis. PMH includes dementia, chronic constipation, CAD, CABG.  P Skilled nursing facility    HISTORY   MEDICAL HISTORY  Reason for Referral: RN dysphagia screen    Problem List  Principal Problem:    Vomiting, intractability of vomiting not specified, presence of nausea not specified, unspecified vomiting type  Active P Strength: Appears impaired  Laryngeal Elevation Coordination: Appears intact  (Please note: Silent aspiration cannot be evaluated clinically.  Videofluoroscopic Swallow Study is required to rule-out silent aspiration.)    Esophageal Phase of Swallow: No com

## 2020-11-05 NOTE — PHYSICAL THERAPY NOTE
PHYSICAL THERAPY EVALUATION - INPATIENT     Room Number: 480/454-T  Evaluation Date: 11/5/2020  Type of Evaluation: Initial  Physician Order: PT Eval and Treat    Presenting Problem: NVD/Pneumonia  Reason for Therapy: Mobility Dysfunction and Dischar COGNITION  · Overall Cognitive Status:  WFL - within functional limits    RANGE OF MOTION AND STRENGTH ASSESSMENT  Upper extremity ROM and strength are within functional limits     Lower extremity ROM is within functional limits     Lower extremity str addressed; Alarm set    ASSESSMENT   Patient is a 80year old male admitted on 11/4/2020 for NVD. Pertinent comorbidities and personal factors impacting therapy include dementia.   In this PT evaluation, the patient presents with the following impairments d

## 2020-11-05 NOTE — CONSULTS
[facility]  Report of Consultation    Barbara Holm [id number] Patient Status:  Observation    1926 MRN [id number]   [facility] 4NW-A Attending Randee Vela MD   Hosp Day # 0 PCP Napoleon Palmer MD       Assessment / Plan:    1) EDMUND- lik drink alcohol or use drugs.     Allergies:    Toprol Xl [Metoprol*        Comment:XL TB24; lung congestion    Medications:    Current Facility-Administered Medications:   •  amLODIPine Besylate (NORVASC) tab 2.5 mg, 2.5 mg, Oral, Daily  •  Pravastatin Sodiu WBC 6.9 11/04/2020    HGB 10.5 11/04/2020    HCT 31.8 11/04/2020    .0 11/04/2020    CREATSERUM 2.01 11/04/2020    BUN 39 11/04/2020     11/04/2020    K 4.8 11/04/2020     11/04/2020    CO2 23.0 11/04/2020     11/04/2020    CA 9.1

## 2020-11-05 NOTE — PLAN OF CARE
Admitted this 81y/o male from [facility] for intractable vbomiting & PNA. Patient had been having episodes of multiple coffee ground emesis & had epistaxis this am. maging also showed patient w/ PNA. Patient was observed having episodes of dry cough,Failed dys

## 2020-11-06 PROBLEM — Z66 DNAR (DO NOT ATTEMPT RESUSCITATION): Status: ACTIVE | Noted: 2020-11-06

## 2020-11-06 PROBLEM — Z78.9 DNI (DO NOT INTUBATE): Status: ACTIVE | Noted: 2020-11-06

## 2020-11-06 PROBLEM — Z51.5 PALLIATIVE CARE ENCOUNTER: Status: ACTIVE | Noted: 2020-11-06

## 2020-11-06 PROBLEM — Z71.89 COUNSELING REGARDING ADVANCE CARE PLANNING AND GOALS OF CARE: Status: ACTIVE | Noted: 2020-11-06

## 2020-11-06 PROCEDURE — 99233 SBSQ HOSP IP/OBS HIGH 50: CPT | Performed by: INTERNAL MEDICINE

## 2020-11-06 PROCEDURE — 99221 1ST HOSP IP/OBS SF/LOW 40: CPT | Performed by: NURSE PRACTITIONER

## 2020-11-06 RX ORDER — AMLODIPINE BESYLATE 2.5 MG/1
2.5 TABLET ORAL DAILY
Status: DISCONTINUED | OUTPATIENT
Start: 2020-11-06 | End: 2020-11-08

## 2020-11-06 RX ORDER — ALBUTEROL SULFATE 90 UG/1
2 AEROSOL, METERED RESPIRATORY (INHALATION)
Status: DISCONTINUED | OUTPATIENT
Start: 2020-11-06 | End: 2020-11-09

## 2020-11-06 NOTE — PHYSICAL THERAPY NOTE
PHYSICAL THERAPY TREATMENT NOTE - INPATIENT    Room Number: 925/250-R     Session: 1   Number of Visits to Meet Established Goals: 5    Presenting Problem: NVD/Pneumonia    History related to current admission: pt admitted with NVD and found to have PNA a -  Dynamic Standing: Poor +    ACTIVITY TOLERANCE                         O2 WALK                    AM-PAC '6-Clicks' INPATIENT SHORT FORM - BASIC MOBILITY  How much difficulty does the patient currently have. ..  -   Turning over in bed (including adjusti End of Session: Up in chair;Needs met;Call light within reach;RN aware of session/findings; All patient questions and concerns addressed; Alarm set(posey and wrist restraints )    ASSESSMENT   Pt continues to present with impaired strength  , decreased endur

## 2020-11-06 NOTE — PLAN OF CARE
Up sitting in the chair. Quiet & calm. Wrist restraints & posey vest in place. NO complaints of pain,no SOB. SPO2 9-96%. Occasional coughing noted,robitussin given PRN MDI & zosyn for PNA. Oral care rendered. Head of bed maintained above 30deg. Monitored close

## 2020-11-06 NOTE — PLAN OF CARE
Received a call from dr Dory Hudson ordering patient to be tested for Covid PCR. Patient had a neg rapid test result but on high suspicion due to he came from a facility w/ a lot of cases & that he has PNA & is coughing a lot.  Patient will be transferred to [facility]

## 2020-11-06 NOTE — PROGRESS NOTES
[facility]    Progress Note    Gerre Denise Patient Status:  Inpatient    1926 MRN [id number]   [facility] 4NW-A Attending Jakub Siddiqui MD   Hosp Day # 1 PCP Adria Vallejo MD        Subjective:   Pt fell yesterday and has b hematuria following alvarado placement. - Maintain alvarado until agitation and constipation resolves. Excellent UOP currently.  Suspect mild hydronephrosis on imaging was 2/2 bladder distention.  - proscar and rapaflo  - would encourage ambulation once agitat

## 2020-11-06 NOTE — PROGRESS NOTES
[facility]  Nephrology Progress Note    Sergey Obando Attending:  Iqra Enamorado MD       Assessment and Plan:    1) EDMUND- due to urinary retention / WINSLOW with hydronephrosis +/- mild volume depletion.  Meds benign; UA bland; no NSAIDS / contrast rajani 11/06/2020     11/06/2020    K 4.6 11/06/2020     11/06/2020    CO2 27.0 11/06/2020    GLU 87 11/06/2020    CA 9.1 11/06/2020    ALB 2.7 11/06/2020    ALKPHO 76 11/06/2020    BILT 0.5 11/06/2020    TP 6.5 11/06/2020    AST 21 11/06/2020    ALT

## 2020-11-06 NOTE — PROGRESS NOTES
[facility]  Progress Note    Charlotte Pollo Patient Status:  Inpatient    1926 MRN [id number]   [facility] 4NW-A Attending Randee Vela MD   Hosp Day # 1 PCP Napoleon Pamler MD         SUBJECTIVE:  Subjective:  Charlotte Abad --   --  1.8   PHOS  --   --  3.5 3.4   * 104* 87 87       Recent Labs   Lab 11/03/20  1320 11/04/20  1030 11/05/20  0814 11/06/20  0529   ALT 21 18 16 13*   AST 33 34 26 21   ALB 3.2* 3.0* 2.9* 2.7*       No results for input(s): PGLU in the last function evaluation and assessment of valvular function. Image quality was adequate. ---------------------------------------------------------------------------- Conclusions: 1. Left ventricle:  The cavity size was normal. Wall thickness was at the    upper There was trivial regurgitation. Peak gradient (D): 3mm Hg. Aortic valve:   Trileaflet; mildly calcified leaflets. Cusp separation was normal.  Doppler:  Transvalvular velocity was within the normal range. There was no stenosis. Trivial regurgitation. Value        Reference  Aortic root ID, ED              (H)     4.2   cm     <3.8  Ascending aorta ID, A-P, ED             4.0   cm     ---------   Left atrium                             Value        Reference  LA ID, A-P, ES ABDOMEN+PELVIS KIDNEYSTONE 2D RNDR(NO IV,NO ORAL)(CPT=74176)  COMPARISON:  None. INDICATIONS:  NVD  TECHNIQUE:  Unenhanced multislice CT scanning from above the kidneys to below the urinary bladder.   2D rendering are generated on the CT scanner workstatio marked enlargement of prostate in distention of bladder suggesting bladder outlet obstruction. 3. Large amount of retained fecal debris and rectum consistent with constipation with fecal impaction. 4. Cholelithiasis without CT evidence of cholecystitis.  5. • finasteride  5 mg Oral Daily   • piperacillin-tazobactam  3.375 g Intravenous Q12H   • Pantoprazole Sodium  40 mg Oral QAM AC   • Pravastatin Sodium  20 mg Oral Nightly   • PEG 3350  17 g Oral BID   • mineral oil  1 enema Rectal Q8H       guaiFENesin, no obstructing stone. 2. There is marked enlargement of prostate in distention of bladder suggesting bladder outlet obstruction. 3. Large amount of retained fecal debris and rectum consistent with constipation with fecal impaction.   4. Cholelithiasis wit

## 2020-11-06 NOTE — PROGRESS NOTES
[facility] Pulmonary/Critical Care Progress Note     SUBJECTIVE/Interval history: All events, procedures, notes reviewed. Pt had large BM overnight and denies cough or sob.  Per rn, pt has nonproductive cough      Review  112 111   CO2 23.0 25.0 27.0     Recent Labs   Lab 11/04/20  1030 11/04/20  1030 11/05/20  0814 11/05/20  1845 11/06/20  0529 11/06/20  0748   RBC 3.56*  --  3.41*  --  3.20*  --    HGB 10.5*   < > 10.1* 10.1* 9.4* 9.4*   HCT 31.8*  --  30.1*  -- 5. Uncomplicated diverticulosis of the colon. 6. Bilateral fat containing inguinal hernias. Study was significantly limited by motion.     Dictated by (CST): Tona Starkey MD on 11/04/2020 at 12:28 PM     Finalized by (CST): Tona Starkey MD on 11/04/2020

## 2020-11-06 NOTE — CM/SW NOTE
MSW spoke with the patient's POA Father Yoli Meyer. The patient is a LTC resident at [facility]  [phone] and will return by ambulance when medically cleared for dc.     Leatha Saldana LCSW

## 2020-11-06 NOTE — PROGRESS NOTES
Pt confused, pleasant and following commands. Restless at times. RA. Afebrile. VSS. Mineral oil enema administered. No bowel movement overnight. Pt has strong nonproductive cough. Respiratory called and recommending cough suppressant instead of nebs.  Harpreet Olsen

## 2020-11-06 NOTE — CONSULTS
[id number] [facility] 149 Initial Consult    Miya Feliciano Patient Status:  Inpatient    1926 MRN [id number]   [facility] 4NW-A Attending Leila Casey MD   Hosp Day # 1 PCP Zoraida Buchanan MD     Date of Consult: 20 • OTHER SURGICAL HISTORY      surgery testis       Palliative Care Social History:        Substance Use History:  Social History    Tobacco Use      Smoking status: Never Smoker      Smokeless tobacco: Never Used    Alcohol use: No       Marital Status: mg total) by mouth every evening. SUBJECTIVE  Review of Systems – Palliative Care Symptom Assessment     DANIE - bedside visit deferred for reasons noted above, PPE preservation. Visualized through door - on video monitor to room.  In bed, posey and BL mass index is 27.07 kg/m². Physical Exam:   Full physical exam deferred for reason noted above. Visualized on video monitor in bed. + posey and BL soft wrist restraints secured. Looking around room. Restless and removing bed linens.  On room air with ab DNAR and DNI and informed Fr. Greene that I am placing order to reflect this directive in pt's record. He is unsure if a POLST form exists, and he stated he will check pt's ACP documents and produce document if able to locate.  Otherwise, he is aware of imp whether generalized or localized, unspecified site     Pulmonary collapse     Other and unspecified hyperlipidemia     Numbness of legs     Ear problem     Weakness     Fever     Weakness generalized     Fever, unspecified fever cause     Anemia     Vomiti

## 2020-11-07 PROCEDURE — 99232 SBSQ HOSP IP/OBS MODERATE 35: CPT | Performed by: INTERNAL MEDICINE

## 2020-11-07 NOTE — PLAN OF CARE
Asleep at this time. Maintained on posey vest & soft wrist restraints. Frequent rounding rendered. Kept comfortable & clean. IV antibiotics for PNA administered,head of bed maintained above 30deg. Turned & repositioned.

## 2020-11-07 NOTE — PLAN OF CARE
Unable to administer mineral oil enema since it's not supplied by [facility] & is not supplied by pharmacy either. Few doses were given,stock was from [facility] but [facility] kis out too as well today. Informed Dr Carr Best re this & he would like RN to inform GI instead. Paged Gi

## 2020-11-07 NOTE — PROGRESS NOTES
[facility]  Nephrology Progress Note    Divina Obando Attending:  Yolande Nair MD       Assessment and Plan:    1) EDMUND- due to urinary retention / WINSLOW with hydronephrosis +/- mild volume depletion.  Meds benign; UA bland; no NSAIDS / contrast rajani 11/07/2020    CO2 26.0 11/07/2020    GLU 87 11/07/2020    CA 9.1 11/07/2020    ALB 2.9 11/07/2020    ALKPHO 75 11/07/2020    BILT 0.5 11/07/2020    TP 7.2 11/07/2020    AST 27 11/07/2020    ALT 14 11/07/2020       Imaging: All imaging studies reviewed.

## 2020-11-07 NOTE — PROGRESS NOTES
[facility]  Progress Note    Adria Omar Patient Status:  Inpatient    1926 MRN [id number]   [facility] 4NW-A Attending Lupillo Cifuentes MD   Hosp Day # 2 PCP Cy Edwards MD         SUBJECTIVE:  Subjective:  Adria Nelson K 5.0 4.8 4.9 4.6 4.7   * 112 112 111 109   CO2 24.0 23.0 25.0 27.0 26.0   BUN 44* 39* 37* 38* 35*   CREATSERUM 2.16* 2.01* 2.23* 2.04* 2.11*   CA 8.8 9.1 9.2 9.1 9.1   MG  --   --   --  1.8  --    PHOS  --   --  3.5 3.4  --    * 104* 87 87 included M-mode, complete spectral Doppler, and color Doppler. Inpatient. Room 401. Comparison was made to the study of 03/30/2010. This was a routine echocardiographic study.  Transthoracic echocardiography for ventricular function evaluation and assess normal. Right atrium:  The atrium was normal in size. Mitral valve:   Structurally normal valve. Leaflet separation was normal. Doppler:  Transvalvular velocity was within the normal range. There was no evidence for stenosis.  There was trivial regurgitat LV E/e', medial                         22           ---------   Ventricular septum                      Value        Reference  IVS thickness, ED, PLAX                 0.9   cm     0.6 - 1.0   Aorta                                   Value        Reference ---------------------------------------------------------------------------- Prepared and electronically signed by Radha Archibald MD 11/05/2020 11:07     Ct Abdomen+pelvis Kidneystone 2d Rndr(no Iv,no Oral)(cpt=74176)    Result Date: 11/4/2020  PROCEDURE:  CT A distention of the urinary bladder. LUNG BASES:  Dependent consolidation in lower lobes is probably dependent atelectasis. OTHER:  Negative. CONCLUSION:  1. There is moderate right hydronephrosis although there is no obstructing stone.  2. There by (CST): Dafne Burkett MD on 11/04/2020 at 2:42 PM           Meds:     • amLODIPine Besylate  2.5 mg Oral Daily   • Albuterol Sulfate HFA  2 puff Inhalation Q6H WA   • Heparin Sodium (Porcine)  5,000 Units Subcutaneous Q12H   • tamsulosin HCl  0.4 mg Oral Da care planning and goals of care    DNAR (do not attempt resuscitation)    DNI (do not intubate)          Plan:  Continue present management,      Vomiting, intractability of vomiting not specified, presence of nausea not specified, unspecified vomiting typ management       DVT prophylaxis–SCDs, subcu heparin        WEAKNESS- PT OT EVAL AND TREAT  DISPOSITION PER     See tests ordered,  Available and radiology reviewed  All consultant notes reviewed  Discussed with nursing on floor  dvt prophylax

## 2020-11-07 NOTE — PLAN OF CARE
Patient alert, oriented to person, disoriented to time, place and situation. Patient restless, on posey vest and bilateral wrist restraints. Leo draining dark loyd urine. Respirations non-labored, lungs sound diminished, O2 sat 96% on room air.  Patient

## 2020-11-07 NOTE — PROGRESS NOTES
Pt Alert to self  Room  Air  Meds given per Mar  No distress noted  Frostproof Vest and soft restraints in place  Isolation maintained  Covid PCR pending  PNA Zosyn given   Safety precaution maintained  Will continue to monitor

## 2020-11-07 NOTE — PROGRESS NOTES
[facility]  Progress Note    Miya Feliciano Patient Status:  Inpatient    1926 MRN [id number]   [facility] 4NW-A Attending Leila Casey MD   Hosp Day # 2 PCP Zoraida Buchanan MD     Subjective:  Miya Feliciano is a(n) 80 year THGB in the last 168 hours.     Invalid input(s): JWA10DAM, CHOB    Invalid input(s): CKTOTAL, TROPONINI, TROPONINT, CKMBINDEX    Cultures:  NGTD    Radiology:  None new    Medications Reviewed:    •  amLODIPine Besylate (NORVASC) tab 2.5 mg, 2.5 mg, Oral,

## 2020-11-08 PROCEDURE — 99232 SBSQ HOSP IP/OBS MODERATE 35: CPT | Performed by: INTERNAL MEDICINE

## 2020-11-08 RX ORDER — AMLODIPINE BESYLATE 5 MG/1
5 TABLET ORAL DAILY
Status: DISCONTINUED | OUTPATIENT
Start: 2020-11-08 | End: 2020-11-09

## 2020-11-08 RX ORDER — BISACODYL 10 MG
10 SUPPOSITORY, RECTAL RECTAL
Status: DISCONTINUED | OUTPATIENT
Start: 2020-11-08 | End: 2020-11-09

## 2020-11-08 NOTE — PROGRESS NOTES
[facility]  Nephrology Progress Note    Michael Obando Attending:  Jakub Siddiqui MD       Assessment and Plan:    1) EDMUND- due to urinary retention / WINSLOW with hydronephrosis +/- mild volume depletion.  Meds benign; UA bland; no NSAIDS / contrast rajani reviewed.     Meds:     •  Oxymetazoline HCl (NASAL DECONGESTANT SPRAY) 0.05 % nasal spray 3-4 spray, 3-4 spray, Each Nare, Q2H PRN    •  amLODIPine Besylate (NORVASC) tab 2.5 mg, 2.5 mg, Oral, Daily    •  Albuterol Sulfate  (90 Base) MCG/ACT inhaler

## 2020-11-08 NOTE — PLAN OF CARE
Patient alert and oriented x1, pleasantly confused. Vital signs stable. No fever. Nonproductive coughing noted due to Pneumonia. Small amount of clear emesis observed. Nose bleeding episode noted as well. Dr. Gian Oviedo on-call made aware, ordered ENT Consult.

## 2020-11-08 NOTE — PLAN OF CARE
Patient alert, calm. Patient with occasional non-productive cough, respirations non-labored, lungs sound diminished, O sat 94% -96% on room air. No nose bleeding noted. Patient afebrile.

## 2020-11-08 NOTE — PROGRESS NOTES
[facility] INTERNIST  Progress Note     Gustavo Galloway Patient Status:  Inpatient    1926 MRN [id number]   [facility] 4NW-A Attending Iqra Enamorado MD   Hosp Day # 3 PCP Corinna Kamara MD     Chief Complaint: aspiration PNA    S: Pa results found.     Medications:   • amLODIPine Besylate  5 mg Oral Daily   • Albuterol Sulfate HFA  2 puff Inhalation Q6H WA   • Heparin Sodium (Porcine)  5,000 Units Subcutaneous Q12H   • tamsulosin HCl  0.4 mg Oral Daily   • finasteride  5 mg Oral Daily prophylaxis–subcu Lovenox      See tests ordered,  Available and radiology reviewed  All consultant notes reviewed  Discussed with nursing on floor  dvt prophylaxis reviewed  PT and/or OT     Georgeann Schlatter, MD

## 2020-11-08 NOTE — PROGRESS NOTES
[facility]  Progress Note    Claude Robins Patient Status:  Inpatient    1926 MRN [id number]   [facility] 4NW-A Attending Katherine Holbrook MD   Hosp Day # 3 PCP Mike Cantrell MD     Subjective:  Claude Robins is a(n) 80 year Component Value Date    PHOS 3.4 11/06/2020        Recent Labs   Lab 11/04/20  1030   INR 1.11       No results for input(s): ABGPHT, NRQNZH2J, EVAAT0X, ABGHCO3, ABGBE, TEMP, DINORAH, SITE, DEV, THGB in the last 168 hours.     Invalid input(s): MTY12JLN, [facility] evaluation    Discussed w RADHA Sears MD  [facility]

## 2020-11-09 VITALS
SYSTOLIC BLOOD PRESSURE: 135 MMHG | RESPIRATION RATE: 18 BRPM | HEART RATE: 60 BPM | OXYGEN SATURATION: 95 % | HEIGHT: 60 IN | BODY MASS INDEX: 27.26 KG/M2 | DIASTOLIC BLOOD PRESSURE: 60 MMHG | WEIGHT: 138.88 LBS | TEMPERATURE: 98 F

## 2020-11-09 PROCEDURE — 99232 SBSQ HOSP IP/OBS MODERATE 35: CPT | Performed by: INTERNAL MEDICINE

## 2020-11-09 RX ORDER — AMLODIPINE BESYLATE 5 MG/1
5 TABLET ORAL DAILY
Qty: 30 TABLET | Refills: 0 | Status: SHIPPED | OUTPATIENT
Start: 2020-11-10

## 2020-11-09 RX ORDER — FINASTERIDE 5 MG/1
5 TABLET, FILM COATED ORAL DAILY
Qty: 90 TABLET | Refills: 5 | Status: SHIPPED | OUTPATIENT
Start: 2020-11-09

## 2020-11-09 RX ORDER — AMOXICILLIN AND CLAVULANATE POTASSIUM 875; 125 MG/1; MG/1
1 TABLET, FILM COATED ORAL 2 TIMES DAILY
Qty: 6 TABLET | Refills: 0 | Status: SHIPPED | OUTPATIENT
Start: 2020-11-09 | End: 2020-11-12

## 2020-11-09 RX ORDER — SILODOSIN 8 MG/1
8 CAPSULE ORAL EVERY EVENING
Qty: 90 CAPSULE | Refills: 5 | Status: SHIPPED | OUTPATIENT
Start: 2020-11-09 | End: 2020-12-09

## 2020-11-09 NOTE — PROGRESS NOTES
[facility]  Progress Note    Luisito Ware Patient Status:  Inpatient    1926 MRN [id number]   [facility] 4NW-A Attending Enrico Orosco MD   Hosp Day # 4 PCP Marie Montilla MD         SUBJECTIVE:  Subjective:  Luisito Ware 4.5 4.6    111 109 108 108   CO2 25.0 27.0 26.0 25.0 25.0   BUN 37* 38* 35* 41* 41*   CREATSERUM 2.23* 2.04* 2.11* 2.29* 2.24*   CA 9.2 9.1 9.1 9.0 9.3   MG  --  1.8  --   --   --    PHOS 3.5 3.4  --   --   --    GLU 87 87 87 89 88       Recent Labs complete spectral Doppler, and color Doppler. Inpatient. Room 401. Comparison was made to the study of 03/30/2010. This was a routine echocardiographic study.  Transthoracic echocardiography for ventricular function evaluation and assessment of valvular atrium:  The atrium was normal in size. Mitral valve:   Structurally normal valve. Leaflet separation was normal. Doppler:  Transvalvular velocity was within the normal range. There was no evidence for stenosis. There was trivial regurgitation.     Peak g medial                         22           ---------   Ventricular septum                      Value        Reference  IVS thickness, ED, PLAX                 0.9   cm     0.6 - 1.0   Aorta                                   Value        Reference  Aortic ---------------------------------------------------------------------------- Prepared and electronically signed by Radha Archibald MD 11/05/2020 11:07     Ct Abdomen+pelvis Kidneystone 2d Rndr(no Iv,no Oral)(cpt=74176)    Result Date: 11/4/2020  PROCEDURE:  CT A distention of the urinary bladder. LUNG BASES:  Dependent consolidation in lower lobes is probably dependent atelectasis. OTHER:  Negative. CONCLUSION:  1. There is moderate right hydronephrosis although there is no obstructing stone.  2. There by (CST): Sandee Rapp MD on 11/04/2020 at 2:42 PM           Meds:     • amLODIPine Besylate  5 mg Oral Daily   • Albuterol Sulfate HFA  2 puff Inhalation Q6H WA   • Heparin Sodium (Porcine)  5,000 Units Subcutaneous Q12H   • tamsulosin HCl  0.4 mg Oral Eder planning and goals of care    DNAR (do not attempt resuscitation)    DNI (do not intubate)          Plan:  Continue present management,      Vomiting, intractability of vomiting not specified, presence of nausea not specified, unspecified vomiting type–Imp subcu heparin     Edema–CKD–amlodipine effect–ejection fraction 66 5%–medical management    Urinary retention–Leo/Proscar/Flomax for     Discharge planning     WEAKNESS- PT OT EVAL AND TREAT  DISPOSITION PER     See tests ordered,  Availab

## 2020-11-09 NOTE — PLAN OF CARE
Pt. Alert and orientated to self. Pleasantly confused. No agitation noted. Restraints remain off. Pt. Remains on room air; O2 sats in the mid 90's. Afebrile. No nose bleed noted today. Leo catheter patent and draining clear yellow urine.  Pt. Able to take Swallowing  Goal: Minimize aspiration risk  Description: Interventions:  - Patient should be alert and upright for all feedings (90 degrees preferred)  - Offer food and liquids at a slow rate  - Encourage small bites of food and small sips of liquid  - Off

## 2020-11-09 NOTE — PROGRESS NOTES
[facility]  Progress Note    Roseann Stafford Patient Status:  Inpatient    1926 MRN [id number]   [facility] 4NW-A Attending Abhinav Colmenares MD   [facility] Day # 4 PCP Rosalio Thomson MD     Subjective:  Roseann Stafford is a(n) 80 year 0.5 0.4  --    TP 6.5 7.2 6.9  --        Recent Labs   Lab 11/06/20  0529   MG 1.8       Lab Results   Component Value Date    PHOS 3.4 11/06/2020        Recent Labs   Lab 11/04/20  1030   INR 1.11       No results for input(s): ABGPHT, BUFOST7T, OMZET1X, cough suppression  · Monitor creatinine and electrolytes, per renal  · Prophylaxis- heparin SQ, PPI  · Disp- discharge planning ongoing. Off restraints today  · Code status DNR/DNI.   Ongoing palliative care evaluation    Discussed w BENEDICT Espana

## 2020-11-09 NOTE — PROGRESS NOTES
[facility]  Nephrology Progress Note    Armida Obando Attending:  Narendra Luz MD       Assessment and Plan:    1) EDMUND- due to urinary retention / WINSLOW with hydronephrosis +/- mild volume depletion.  Meds benign; UA bland; no NSAIDS / contrast rajani 11/09/2020     11/09/2020    K 4.6 11/09/2020     11/09/2020    CO2 25.0 11/09/2020    GLU 88 11/09/2020    CA 9.3 11/09/2020       Imaging: All imaging studies reviewed.     Meds:     •  amLODIPine Besylate (NORVASC) tab 5 mg, 5 mg, Oral, Eder

## 2020-11-09 NOTE — CM/SW NOTE
I called and gave report to nurse Darci Nuñez at [address] [place], [place] [address]ab [facility]. Patient's physical and history were relayed to nursing staff and included past medical history, admitting diagnosis of intractable vomiting.  Patient will be picked up via Ambulance at

## 2020-11-09 NOTE — CM/SW NOTE
Sent updated clinicals via [employer].  MARCO to remain available and follow up if any other needs arise

## 2020-11-09 NOTE — PALLIATIVE CARE NOTE
Chart reviewed noting possible DC today. Pt is DNAR/DNI without POLST form on file. On consult with pt's HCPOA, [address] confirmed pt's current code status as reflected per Epic order to be DNAR/DNI with [facility] as Fr. Greene informs t

## 2020-11-09 NOTE — CM/SW NOTE
11/09/20 1500   Discharge disposition   Expected discharge disposition Skilled Nurs   Name of [id number]   Discharge transportation Other (comment)  ([facility])   [facility] ambulance placed SW on hold for a long time.  Called [facility]

## 2020-11-09 NOTE — PROGRESS NOTES
Alert,oriented to self. Pulling off clothing, but no attempt to leave the bed,or pull at medical equipment. Resting quietly at this time.

## 2020-11-11 NOTE — DISCHARGE SUMMARY
[facility]  Discharge Summary    Radha Calderon VXENRIKEYY Patient Status:  Inpatient    1926 MRN [id number]   [facility] 4NW-A Attending No att. providers found   [facility] Day # 4 PCP Vero Blum MD     Date of Admission: 2020    Date Services*                                                     [address], [address]                                                               [phone] -------------- function was    normal. 5. Pulmonary arteries: Systolic pressure was moderately increased, in the    range of 50mm Hg to 55mm Hg. 6. No significant valvular regurgitation or stenosis. 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 Pulmonary artery:   Systolic pressure was moderately increased, in the range of 50mm Hg to 55mm Hg. Systemic veins: Inferior vena cava: The vessel was normal in size.  The respirophasic diameter changes were in the normal ran Reference  Mitral E-wave peak velocity             0.83  m/sec  ---------  Mitral A-wave peak velocity             1.62  m/sec  ---------  Mitral peak gradient, D                 3     mm Hg  ---------  Mitral E/A ratio, peak                  0.5 exophytic cyst upper pole left kidney. There is no significant left-sided hydronephrosis. ADRENALS:  No mass or enlargement. LIVER:  No enlargement, atrophy, abnormal density, or significant focal lesion. BILIARY:  There is cholelithiasis.   There is no CHEST AP PORT, 4/05/2010, 3:46 PM.  INDICATIONS:  NVD  PATIENT STATED HISTORY: (As transcribed by Technologist)  Patient shares abdomen pain with vomit after a bloody nose. FINDINGS:  Postsurgical changes of the chest are noted.   Calcific atherosclerosi             Extremities:    no cyanosis, icterus                                   Neurologic :  General weakness,                                                         Data Review:       Labs:                    Recent Labs   Lab 11/04/20  1030 11/04/ [place]  [facility]                                                         [place], [address]                                                               [phone] --------------------------- normal. 5. Pulmonary arteries: Systolic pressure was moderately increased, in the    range of 50mm Hg to 55mm Hg. 6. No significant valvular regurgitation or stenosis. Impressions:   This study is compared with previous dated 03/30/10: Improved visualizatin sinus of Valsalva. Pulmonary artery:   Systolic pressure was moderately increased, in the range of 50mm Hg to 55mm Hg. Systemic veins: Inferior vena cava: The vessel was normal in size.  The respirophasic diameter changes were in the normal range (greater t Mitral E-wave peak velocity             0.83  m/sec  ---------  Mitral A-wave peak velocity             1.62  m/sec  ---------  Mitral peak gradient, D                 3     mm Hg  ---------  Mitral E/A ratio, peak                  0.5          --------- cyst upper pole left kidney. There is no significant left-sided hydronephrosis. ADRENALS:  No mass or enlargement. LIVER:  No enlargement, atrophy, abnormal density, or significant focal lesion. BILIARY:  There is cholelithiasis.   There is no CT evidenc AP PORT, 4/05/2010, 3:46 PM.  INDICATIONS:  NVD  PATIENT STATED HISTORY: (As transcribed by Technologist)  Patient shares abdomen pain with vomit after a bloody nose. FINDINGS:  Postsurgical changes of the chest are noted.   Calcific atherosclerosis note Weakness     Fever     Weakness generalized     Fever, unspecified fever cause     Anemia     Vomiting     Vomiting, intractability of vomiting not specified, presence of nausea not specified, unspecified vomiting type     Pneumonia due to infectious organ bleed       Vomiting  Due to constipation/large fecal retention in colon, laxatives, MiraLAX, hydration, when necessary Zofran  Improved       Pneumonia due to infectious organism, unspecified laterality, unspecified part of lung  Given 1 dose antibiotic, mg total) by mouth daily. , Print Script, Disp-90 tablet, R-5    Silodosin (RAPAFLO) 8 MG Oral Cap  Take 1 capsule (8 mg total) by mouth every evening., Print Script, Disp-90 capsule, R-5      CONTINUE these medications which have NOT CHANGED    !!  Acetamin

## 2020-11-23 ENCOUNTER — APPOINTMENT (OUTPATIENT)
Dept: CARDIOLOGY | Age: 85
End: 2020-11-23

## 2020-12-31 LAB — AMB EXT COVID-19 RESULT: DETECTED

## 2021-01-21 ENCOUNTER — HOSPITAL ENCOUNTER (INPATIENT)
Facility: HOSPITAL | Age: 86
LOS: 4 days | Discharge: NURSING, REHAB OR LONG-TERM CARE | DRG: 194 | End: 2021-01-25
Attending: EMERGENCY MEDICINE | Admitting: INTERNAL MEDICINE
Payer: MEDICARE

## 2021-01-21 ENCOUNTER — APPOINTMENT (OUTPATIENT)
Dept: GENERAL RADIOLOGY | Facility: HOSPITAL | Age: 86
DRG: 194 | End: 2021-01-21
Payer: MEDICARE

## 2021-01-21 DIAGNOSIS — J12.82 PNEUMONIA DUE TO COVID-19 VIRUS: ICD-10-CM

## 2021-01-21 DIAGNOSIS — R06.03 ACUTE RESPIRATORY DISTRESS: Primary | ICD-10-CM

## 2021-01-21 DIAGNOSIS — U07.1 PNEUMONIA DUE TO COVID-19 VIRUS: ICD-10-CM

## 2021-01-21 DIAGNOSIS — N39.0 URINARY TRACT INFECTION WITHOUT HEMATURIA, SITE UNSPECIFIED: ICD-10-CM

## 2021-01-21 LAB
ADENOVIRUS PCR:: NEGATIVE
ALBUMIN SERPL-MCNC: 3.1 G/DL (ref 3.4–5)
ALBUMIN/GLOB SERPL: 0.7 {RATIO} (ref 1–2)
ALLENS TEST: POSITIVE
ALP LIVER SERPL-CCNC: 89 U/L
ALT SERPL-CCNC: 15 U/L
ANION GAP SERPL CALC-SCNC: 5 MMOL/L (ref 0–18)
APTT PPP: 31.9 SECONDS (ref 25.4–36.1)
ARTERIAL BLD GAS O2 SATURATION: 95 % (ref 92–100)
ARTERIAL BLOOD GAS BASE EXCESS: -1.1 MMOL/L (ref ?–2)
ARTERIAL BLOOD GAS HCO3: 24.3 MEQ/L (ref 22–26)
ARTERIAL BLOOD GAS PCO2: 47 MM HG (ref 35–45)
ARTERIAL BLOOD GAS PH: 7.34 (ref 7.35–7.45)
ARTERIAL BLOOD GAS PO2: 86 MM HG (ref 80–105)
AST SERPL-CCNC: 20 U/L (ref 15–37)
B PERT DNA SPEC QL NAA+PROBE: NEGATIVE
BASOPHILS # BLD AUTO: 0.03 X10(3) UL (ref 0–0.2)
BASOPHILS NFR BLD AUTO: 0.3 %
BILIRUB SERPL-MCNC: 0.2 MG/DL (ref 0.1–2)
BILIRUB UR QL STRIP.AUTO: NEGATIVE
BUN BLD-MCNC: 45 MG/DL (ref 7–18)
BUN/CREAT SERPL: 19.3 (ref 10–20)
C PNEUM DNA SPEC QL NAA+PROBE: NEGATIVE
CALCIUM BLD-MCNC: 9.2 MG/DL (ref 8.5–10.1)
CALCULATED O2 SATURATION: 95 % (ref 92–100)
CARBOXYHEMOGLOBIN: 1.2 % SAT (ref 0–3)
CHLORIDE SERPL-SCNC: 105 MMOL/L (ref 98–112)
CK SERPL-CCNC: 79 U/L
CO2 SERPL-SCNC: 26 MMOL/L (ref 21–32)
COLOR UR AUTO: YELLOW
CORONAVIRUS 229E PCR:: NEGATIVE
CORONAVIRUS HKU1 PCR:: NEGATIVE
CORONAVIRUS NL63 PCR:: NEGATIVE
CORONAVIRUS OC43 PCR:: NEGATIVE
CREAT BLD-MCNC: 2.33 MG/DL
CRP SERPL-MCNC: 2.43 MG/DL (ref ?–0.3)
D-DIMER: 1.69 UG/ML FEU (ref ?–0.94)
DEPRECATED HBV CORE AB SER IA-ACNC: 64.5 NG/ML
DEPRECATED RDW RBC AUTO: 42.9 FL (ref 35.1–46.3)
EOSINOPHIL # BLD AUTO: 0.01 X10(3) UL (ref 0–0.7)
EOSINOPHIL NFR BLD AUTO: 0.1 %
ERYTHROCYTE [DISTWIDTH] IN BLOOD BY AUTOMATED COUNT: 13.5 % (ref 11–15)
FLUAV RNA SPEC QL NAA+PROBE: NEGATIVE
FLUBV RNA SPEC QL NAA+PROBE: NEGATIVE
GLOBULIN PLAS-MCNC: 4.6 G/DL (ref 2.8–4.4)
GLUCOSE BLD-MCNC: 109 MG/DL (ref 70–99)
GLUCOSE UR STRIP.AUTO-MCNC: NEGATIVE MG/DL
HCT VFR BLD AUTO: 29.5 %
HGB BLD-MCNC: 9.3 G/DL
HYALINE CASTS #/AREA URNS AUTO: PRESENT /LPF
IMM GRANULOCYTES # BLD AUTO: 0.07 X10(3) UL (ref 0–1)
IMM GRANULOCYTES NFR BLD: 0.7 %
INR BLD: 1.03 (ref 0.89–1.11)
IONIZED CALCIUM: 1.2 MMOL/L (ref 1.12–1.32)
L/M: 6 L/MIN
LACTATE SERPL-SCNC: 2.1 MMOL/L (ref 0.4–2)
LACTATE SERPL-SCNC: 3 MMOL/L (ref 0.4–2)
LACTIC ACID ARTERIAL: 2.3 MMOL/L (ref 0.5–2)
LDH SERPL L TO P-CCNC: 204 U/L
LYMPHOCYTES # BLD AUTO: 0.38 X10(3) UL (ref 1–4)
LYMPHOCYTES NFR BLD AUTO: 3.5 %
M PROTEIN MFR SERPL ELPH: 7.7 G/DL (ref 6.4–8.2)
MCH RBC QN AUTO: 27.5 PG (ref 26–34)
MCHC RBC AUTO-ENTMCNC: 31.5 G/DL (ref 31–37)
MCV RBC AUTO: 87.3 FL
METAPNEUMOVIRUS PCR:: NEGATIVE
METHEMOGLOBIN: 0.6 % SAT (ref 0.4–1.5)
MONOCYTES # BLD AUTO: 0.76 X10(3) UL (ref 0.1–1)
MONOCYTES NFR BLD AUTO: 7.1 %
MYCOPLASMA PNEUMONIA PCR:: NEGATIVE
NEUTROPHILS # BLD AUTO: 9.5 X10 (3) UL (ref 1.5–7.7)
NEUTROPHILS # BLD AUTO: 9.5 X10(3) UL (ref 1.5–7.7)
NEUTROPHILS NFR BLD AUTO: 88.3 %
NITRITE UR QL STRIP.AUTO: NEGATIVE
NT-PROBNP SERPL-MCNC: 1223 PG/ML (ref ?–450)
OSMOLALITY SERPL CALC.SUM OF ELEC: 294 MOSM/KG (ref 275–295)
P/F RATIO: 178.8 MMHG
PARAINFLUENZA 1 PCR:: NEGATIVE
PARAINFLUENZA 2 PCR:: NEGATIVE
PARAINFLUENZA 3 PCR:: NEGATIVE
PARAINFLUENZA 4 PCR:: NEGATIVE
PATIENT TEMPERATURE: 101.1 F
PH UR STRIP.AUTO: 5 [PH] (ref 4.5–8)
PLATELET # BLD AUTO: 241 10(3)UL (ref 150–450)
POTASSIUM BLOOD GAS: 5.5 MMOL/L (ref 3.6–5.1)
POTASSIUM SERPL-SCNC: 5.1 MMOL/L (ref 3.5–5.1)
PROCALCITONIN SERPL-MCNC: 0.55 NG/ML (ref ?–0.16)
PROT UR STRIP.AUTO-MCNC: 100 MG/DL
PSA SERPL DL<=0.01 NG/ML-MCNC: 13.8 SECONDS (ref 12.4–14.6)
RBC # BLD AUTO: 3.38 X10(6)UL
RBC #/AREA URNS AUTO: >10 /HPF
RHINOVIRUS/ENTERO PCR:: NEGATIVE
RSV RNA SPEC QL NAA+PROBE: NEGATIVE
SARS-COV-2 RNA RESP QL NAA+PROBE: NOT DETECTED
SODIUM BLOOD GAS: 139 MMOL/L (ref 136–144)
SODIUM SERPL-SCNC: 136 MMOL/L (ref 136–145)
SP GR UR STRIP.AUTO: 1.02 (ref 1–1.03)
TOTAL HEMOGLOBIN: 10.9 G/DL
TROPONIN I SERPL-MCNC: <0.045 NG/ML (ref ?–0.04)
TROPONIN I SERPL-MCNC: <0.045 NG/ML (ref ?–0.04)
UROBILINOGEN UR STRIP.AUTO-MCNC: <2 MG/DL
WBC # BLD AUTO: 10.8 X10(3) UL (ref 4–11)
WBC #/AREA URNS AUTO: >50 /HPF

## 2021-01-21 PROCEDURE — 71045 X-RAY EXAM CHEST 1 VIEW: CPT | Performed by: EMERGENCY MEDICINE

## 2021-01-21 RX ORDER — ALBUTEROL SULFATE 90 UG/1
8 AEROSOL, METERED RESPIRATORY (INHALATION)
Status: DISCONTINUED | OUTPATIENT
Start: 2021-01-21 | End: 2021-01-25

## 2021-01-21 RX ORDER — ACETAMINOPHEN 325 MG/1
650 TABLET ORAL EVERY 6 HOURS PRN
Status: DISCONTINUED | OUTPATIENT
Start: 2021-01-21 | End: 2021-01-25

## 2021-01-21 RX ORDER — ONDANSETRON 2 MG/ML
4 INJECTION INTRAMUSCULAR; INTRAVENOUS EVERY 6 HOURS PRN
Status: DISCONTINUED | OUTPATIENT
Start: 2021-01-21 | End: 2021-01-25

## 2021-01-21 RX ORDER — MELATONIN
325 2 TIMES DAILY
COMMUNITY
Start: 2021-01-15

## 2021-01-21 RX ORDER — HEPARIN SODIUM 5000 [USP'U]/ML
5000 INJECTION, SOLUTION INTRAVENOUS; SUBCUTANEOUS EVERY 8 HOURS SCHEDULED
Status: DISCONTINUED | OUTPATIENT
Start: 2021-01-21 | End: 2021-01-25

## 2021-01-21 RX ORDER — IPRATROPIUM BROMIDE AND ALBUTEROL SULFATE 2.5; .5 MG/3ML; MG/3ML
SOLUTION RESPIRATORY (INHALATION)
Status: DISPENSED
Start: 2021-01-21 | End: 2021-01-22

## 2021-01-21 RX ORDER — ESCITALOPRAM OXALATE 5 MG/1
5 TABLET ORAL DAILY
COMMUNITY

## 2021-01-21 RX ORDER — ALBUTEROL SULFATE 2.5 MG/3ML
10 SOLUTION RESPIRATORY (INHALATION) ONCE
Status: DISCONTINUED | OUTPATIENT
Start: 2021-01-21 | End: 2021-01-25

## 2021-01-21 RX ORDER — ACETAMINOPHEN 160 MG
2000 TABLET,DISINTEGRATING ORAL DAILY
COMMUNITY

## 2021-01-21 RX ORDER — DEXAMETHASONE SODIUM PHOSPHATE 10 MG/ML
6 INJECTION, SOLUTION INTRAMUSCULAR; INTRAVENOUS ONCE
Status: COMPLETED | OUTPATIENT
Start: 2021-01-21 | End: 2021-01-21

## 2021-01-21 RX ORDER — SODIUM CHLORIDE 9 MG/ML
INJECTION, SOLUTION INTRAVENOUS CONTINUOUS
Status: DISCONTINUED | OUTPATIENT
Start: 2021-01-21 | End: 2021-01-22

## 2021-01-21 RX ORDER — ACETAMINOPHEN 650 MG/1
650 SUPPOSITORY RECTAL EVERY 6 HOURS PRN
Status: DISCONTINUED | OUTPATIENT
Start: 2021-01-21 | End: 2021-01-25

## 2021-01-22 ENCOUNTER — APPOINTMENT (OUTPATIENT)
Dept: GENERAL RADIOLOGY | Facility: HOSPITAL | Age: 86
DRG: 194 | End: 2021-01-22
Attending: INTERNAL MEDICINE
Payer: MEDICARE

## 2021-01-22 ENCOUNTER — APPOINTMENT (OUTPATIENT)
Dept: CT IMAGING | Facility: HOSPITAL | Age: 86
DRG: 194 | End: 2021-01-22
Attending: INTERNAL MEDICINE
Payer: MEDICARE

## 2021-01-22 LAB
ALBUMIN SERPL-MCNC: 2.9 G/DL (ref 3.4–5)
ALBUMIN/GLOB SERPL: 0.7 {RATIO} (ref 1–2)
ALLENS TEST: POSITIVE
ALP LIVER SERPL-CCNC: 79 U/L
ALT SERPL-CCNC: 15 U/L
ANION GAP SERPL CALC-SCNC: 3 MMOL/L (ref 0–18)
ANION GAP SERPL CALC-SCNC: 3 MMOL/L (ref 0–18)
ANION GAP SERPL CALC-SCNC: 5 MMOL/L (ref 0–18)
ARTERIAL BLD GAS O2 SATURATION: 97 % (ref 92–100)
ARTERIAL BLOOD GAS BASE EXCESS: -0.9 MMOL/L (ref ?–2)
ARTERIAL BLOOD GAS HCO3: 24.5 MEQ/L (ref 22–26)
ARTERIAL BLOOD GAS PCO2: 44 MM HG (ref 35–45)
ARTERIAL BLOOD GAS PH: 7.36 (ref 7.35–7.45)
ARTERIAL BLOOD GAS PO2: 201 MM HG (ref 80–105)
AST SERPL-CCNC: 18 U/L (ref 15–37)
ATRIAL RATE: 197 BPM
BASOPHILS # BLD AUTO: 0.01 X10(3) UL (ref 0–0.2)
BASOPHILS NFR BLD AUTO: 0.1 %
BILIRUB SERPL-MCNC: 0.4 MG/DL (ref 0.1–2)
BUN BLD-MCNC: 46 MG/DL (ref 7–18)
BUN BLD-MCNC: 47 MG/DL (ref 7–18)
BUN BLD-MCNC: 48 MG/DL (ref 7–18)
BUN/CREAT SERPL: 20.3 (ref 10–20)
BUN/CREAT SERPL: 20.4 (ref 10–20)
BUN/CREAT SERPL: 20.4 (ref 10–20)
CALCIUM BLD-MCNC: 9.2 MG/DL (ref 8.5–10.1)
CALCIUM BLD-MCNC: 9.2 MG/DL (ref 8.5–10.1)
CALCIUM BLD-MCNC: 9.4 MG/DL (ref 8.5–10.1)
CALCULATED O2 SATURATION: 100 % (ref 92–100)
CARBOXYHEMOGLOBIN: 1.2 % SAT (ref 0–3)
CHLORIDE SERPL-SCNC: 103 MMOL/L (ref 98–112)
CHLORIDE SERPL-SCNC: 106 MMOL/L (ref 98–112)
CHLORIDE SERPL-SCNC: 106 MMOL/L (ref 98–112)
CO2 SERPL-SCNC: 26 MMOL/L (ref 21–32)
CO2 SERPL-SCNC: 27 MMOL/L (ref 21–32)
CO2 SERPL-SCNC: 29 MMOL/L (ref 21–32)
CREAT BLD-MCNC: 2.25 MG/DL
CREAT BLD-MCNC: 2.32 MG/DL
CREAT BLD-MCNC: 2.35 MG/DL
DEPRECATED RDW RBC AUTO: 42.7 FL (ref 35.1–46.3)
EOSINOPHIL # BLD AUTO: 0 X10(3) UL (ref 0–0.7)
EOSINOPHIL NFR BLD AUTO: 0 %
ERYTHROCYTE [DISTWIDTH] IN BLOOD BY AUTOMATED COUNT: 13.4 % (ref 11–15)
EXPIRATORY PRESSURE: 6 CM H2O
FIO2: 45 %
GLOBULIN PLAS-MCNC: 4.3 G/DL (ref 2.8–4.4)
GLUCOSE BLD-MCNC: 153 MG/DL (ref 70–99)
GLUCOSE BLD-MCNC: 160 MG/DL (ref 70–99)
GLUCOSE BLD-MCNC: 162 MG/DL (ref 70–99)
HCT VFR BLD AUTO: 26.5 %
HGB BLD-MCNC: 8.5 G/DL
IMM GRANULOCYTES # BLD AUTO: 0.12 X10(3) UL (ref 0–1)
IMM GRANULOCYTES NFR BLD: 0.8 %
INSP PRESSURE: 12 CM H2O
IONIZED CALCIUM: 1.24 MMOL/L (ref 1.12–1.32)
LACTATE SERPL-SCNC: 1.7 MMOL/L (ref 0.4–2)
LACTIC ACID ARTERIAL: 2 MMOL/L (ref 0.5–2)
LYMPHOCYTES # BLD AUTO: 0.48 X10(3) UL (ref 1–4)
LYMPHOCYTES NFR BLD AUTO: 3.3 %
M PROTEIN MFR SERPL ELPH: 7.2 G/DL (ref 6.4–8.2)
MCH RBC QN AUTO: 28.2 PG (ref 26–34)
MCHC RBC AUTO-ENTMCNC: 32.1 G/DL (ref 31–37)
MCV RBC AUTO: 88 FL
METHEMOGLOBIN: 0.6 % SAT (ref 0.4–1.5)
MONOCYTES # BLD AUTO: 0.32 X10(3) UL (ref 0.1–1)
MONOCYTES NFR BLD AUTO: 2.2 %
NEUTROPHILS # BLD AUTO: 13.77 X10 (3) UL (ref 1.5–7.7)
NEUTROPHILS # BLD AUTO: 13.77 X10(3) UL (ref 1.5–7.7)
NEUTROPHILS NFR BLD AUTO: 93.6 %
OSMOLALITY SERPL CALC.SUM OF ELEC: 295 MOSM/KG (ref 275–295)
OSMOLALITY SERPL CALC.SUM OF ELEC: 297 MOSM/KG (ref 275–295)
OSMOLALITY SERPL CALC.SUM OF ELEC: 300 MOSM/KG (ref 275–295)
P AXIS: 254 DEGREES
PATIENT TEMPERATURE: 99.6 F
PLATELET # BLD AUTO: 192 10(3)UL (ref 150–450)
POTASSIUM BLOOD GAS: 6.2 MMOL/L (ref 3.6–5.1)
POTASSIUM SERPL-SCNC: 4.5 MMOL/L (ref 3.5–5.1)
POTASSIUM SERPL-SCNC: 5.9 MMOL/L (ref 3.5–5.1)
POTASSIUM SERPL-SCNC: 6 MMOL/L (ref 3.5–5.1)
Q-T INTERVAL: 158 MS
QRS DURATION: 162 MS
QTC CALCULATION (BEZET): 287 MS
R AXIS: -47 DEGREES
RBC # BLD AUTO: 3.01 X10(6)UL
SODIUM BLOOD GAS: 138 MMOL/L (ref 136–144)
SODIUM SERPL-SCNC: 135 MMOL/L (ref 136–145)
SODIUM SERPL-SCNC: 136 MMOL/L (ref 136–145)
SODIUM SERPL-SCNC: 137 MMOL/L (ref 136–145)
T AXIS: 0 DEGREES
TOTAL HEMOGLOBIN: 12.8 G/DL
TROPONIN I SERPL-MCNC: <0.045 NG/ML (ref ?–0.04)
TSI SER-ACNC: 1.55 MIU/ML (ref 0.36–3.74)
VENTRICULAR RATE: 199 BPM
WBC # BLD AUTO: 14.7 X10(3) UL (ref 4–11)

## 2021-01-22 PROCEDURE — 74230 X-RAY XM SWLNG FUNCJ C+: CPT | Performed by: INTERNAL MEDICINE

## 2021-01-22 PROCEDURE — 74176 CT ABD & PELVIS W/O CONTRAST: CPT | Performed by: INTERNAL MEDICINE

## 2021-01-22 PROCEDURE — 99223 1ST HOSP IP/OBS HIGH 75: CPT | Performed by: INTERNAL MEDICINE

## 2021-01-22 PROCEDURE — 99221 1ST HOSP IP/OBS SF/LOW 40: CPT | Performed by: CLINICAL NURSE SPECIALIST

## 2021-01-22 RX ORDER — SODIUM BICARBONATE 150 MEQ/1,000 ML IN DEXTROSE 5 % INTRAVENOUS
75 SOLUTION CONTINUOUS
Status: DISCONTINUED | OUTPATIENT
Start: 2021-01-22 | End: 2021-01-22

## 2021-01-22 RX ORDER — AMLODIPINE BESYLATE 5 MG/1
5 TABLET ORAL DAILY
Status: DISCONTINUED | OUTPATIENT
Start: 2021-01-22 | End: 2021-01-25

## 2021-01-22 RX ORDER — ESCITALOPRAM OXALATE 5 MG/1
5 TABLET ORAL DAILY
Status: DISCONTINUED | OUTPATIENT
Start: 2021-01-22 | End: 2021-01-25

## 2021-01-22 RX ORDER — POLYETHYLENE GLYCOL 3350 17 G/17G
17 POWDER, FOR SOLUTION ORAL DAILY PRN
Status: DISCONTINUED | OUTPATIENT
Start: 2021-01-22 | End: 2021-01-25

## 2021-01-22 RX ORDER — FINASTERIDE 5 MG/1
5 TABLET, FILM COATED ORAL DAILY
Status: DISCONTINUED | OUTPATIENT
Start: 2021-01-22 | End: 2021-01-25

## 2021-01-22 RX ORDER — PRAVASTATIN SODIUM 20 MG
20 TABLET ORAL DAILY
Status: DISCONTINUED | OUTPATIENT
Start: 2021-01-22 | End: 2021-01-25

## 2021-01-22 RX ORDER — BUMETANIDE 0.25 MG/ML
2 INJECTION, SOLUTION INTRAMUSCULAR; INTRAVENOUS ONCE
Status: COMPLETED | OUTPATIENT
Start: 2021-01-22 | End: 2021-01-22

## 2021-01-22 RX ORDER — ASPIRIN 81 MG/1
81 TABLET, CHEWABLE ORAL DAILY
Status: DISCONTINUED | OUTPATIENT
Start: 2021-01-22 | End: 2021-01-25

## 2021-01-22 RX ORDER — DEXAMETHASONE SODIUM PHOSPHATE 4 MG/ML
6 VIAL (ML) INJECTION EVERY 24 HOURS
Status: DISCONTINUED | OUTPATIENT
Start: 2021-01-22 | End: 2021-01-23

## 2021-01-22 NOTE — RESPIRATORY THERAPY NOTE
Pt seen for scheduled MDi and bipap check. Pt's is having no resp  Distress at this time. Pt requesterd to come off bipap. Placed on 3lpm NC  02 sat 99%. RN notified of changes.

## 2021-01-22 NOTE — CONSULTS
[facility] Note  [facility]  Report of Consultation    Roseann Stafford Patient Status:  Inpatient    1926 MRN [id number]   [facility] 5NW-A Attending Abhinav Colmenares MD   [facility] Day # 1 Heparin Sodium (Porcine)  5,000 Units Subcutaneous [facility]   • albuterol sulfate  10 mg Nebulization Once   • Ipratropium Bromide  0.5 mg Nebulization Once   • Albuterol Sulfate HFA  8 puff Inhalation Q4H VONNIE     acetaminophen, acetaminophen, ondansetron H °C) Axillary 68 24 100 % 132 lb 8 oz (60.1 kg)   01/21/21 2139 — — — 86 — 99 % —   01/21/21 2130 139/72 — — 72 21 99 % —   01/21/21 2115 140/64 — — 87 (!) 27 100 % —   01/21/21 2100 121/58 — — 88 22 100 % —   01/21/21 2045 131/69 — — 94 21 100 % —   01/21/ 13.77*   WBC 10.8 14.7*   .0 192.0     No results for input(s): BNP in the last 168 hours.   Recent Labs   Lab 01/21/21 1919 01/21/21  2301 01/22/21  0145   TROP <0.045 <0.045 <0.045   CK 79  --   --      Recent Labs   Lab 01/21/21 1919   INR 1.03 albuterol  · Proph: hep suncut  · D/wrn    Thank you for the consultation. Will follow with you.     Alexandrea Suarez MD

## 2021-01-22 NOTE — CONSULTS
[facility]  Report of Consultation    Roxane Arriaza Patient Status:  Inpatient    1926 MRN [id number]   [facility] 5NW-A Attending Rena Palacios MD   Hosp Day # 1 PCP Jose Cooley MD     Reason for Consultation:    Hyperkale cutaneous gangrene      reports that he has never smoked. He has never used smokeless tobacco. He reports that he does not drink alcohol or use drugs.     Allergies:    Toprol Xl [Metoprol*        Comment:XL TB24; lung congestion    Current Medications: 14.7*   HGB 9.3* 8.5*   MCV 87.3 88.0   .0 192.0   INR 1.03  --        Recent Labs     01/21/21  1919 01/22/21  0145 01/22/21  0240    135* 136   K 5.1 6.0* 5.9*    106 106   CO2 26.0 26.0 27.0   BUN 45* 47* 46*   CREATSERUM 2.33* 2.32* MD  1/22/2021  9:50 AM

## 2021-01-22 NOTE — CM/SW NOTE
Clinical updates sent to [address] [place] [address] in [facility].     [address]  W:[phone]  S:[phone]

## 2021-01-22 NOTE — PROGRESS NOTES
01/21/21 1940   BiPAP   $ RT Standby Charge (per 15 min) 1   $ BiPAP in use daily Yes   Device V60   BiPAP / CPAP CE# 6099   Mode Spontaneous/Timed   Interface Full face mask   Mask Size Medium   Control Settings   Set Rate 16 breaths/min   Set IPAP 12

## 2021-01-22 NOTE — CONSULTS
INFECTIOUS DISEASE CONSULTATION    Adela Burorws Patient Status:  Inpatient    1926 MRN [id number]   [facility] 5NW-A Attending Courtney Norton MD   [facility] Day # 1 PCP Dagoberto Cross, (Other) Brother         cutaneous gangrene      reports that he has never smoked. He has never used smokeless tobacco. He reports that he does not drink alcohol or use drugs.       Allergies:    Toprol Xl [Metoprol*        Comment:XL TB24; lung congestion Rfl:     •  PEG 3350 17 g Oral Powd Pack, Take 17 g by mouth daily as needed. , Disp: , Rfl:     •  Senna 8.6 MG Oral Tab, Take 8.6 mg by mouth 2 (two) times daily as needed (for constipation).   , Disp: , Rfl:     •  Pravastatin Sodium 20 MG Oral Tab, Take Lab 01/21/21 1919 01/21/21  2301 01/22/21  0145   TROP <0.045 <0.045 <0.045   PBNP 1,223*  --   --        Creatinine Kinase  Recent Labs   Lab 01/21/21 1919   CK 79       Inflammatory Markers  Recent Labs   Lab 01/21/21 1919   CRP 2.43*   HARRY 64.5   L site unspecified     EDMUND (acute kidney injury) ([facility] [address]     Stage 4 chronic kidney disease (HCC)      ASSESSMENT/PLAN:  1.  COVID infection  Sometime in December per notes, no lab documentation in chart, an \"EE EXTERNAL RESULT\" was arbitrarily entered as 15

## 2021-01-22 NOTE — DIETARY NOTE
MARINE Otto 81     Admitting diagnosis:  Acute respiratory distress [R06.03]  Urinary tract infection without hematuria, site unspecified [N39.0]  Pneumonia due to COVID-19 virus [U07.1, J12.82]    Ht: 5' 0\"  Wt:

## 2021-01-22 NOTE — CONSULTS
[id number] [facility] 149 Initial Consult    Rhona Pires Patient Status:  Inpatient    1926 MRN [id number]   [facility] 5NW-A Attending Jose D Dye MD   Hosp Day # 1 PCP Jae Elliott MD     Date of Consult: 21 • History of falling    • Obstructive and reflux uropathy, unspecified    • Other and unspecified hyperlipidemia    • Personal history of other diseases of circulatory system    • Retention of urine, unspecified    • Weakness      Past [facility]: [facility]  No current outpatient medications on file. SUBJECTIVE  Review of Systems – Palliative Care Symptom Assessment     DANIE - bedside visit deferred for reasons noted above, PPE preservation. D/w RN. Visualized through door .     Nutritional status/Jeanne 01/22/2021    K 5.9 (H) 01/22/2021     01/22/2021    CO2 27.0 01/22/2021     (H) 01/22/2021    CA 9.2 01/22/2021    ALB 2.9 (L) 01/22/2021    ALKPHO 79 01/22/2021    BILT 0.4 01/22/2021    TP 7.2 01/22/2021    AST 18 01/22/2021    ALT 15 (L) 0 HABITUS:  Body mass index is 25.88 kg/m².     Palliative Performance Scale: 30%    Palliative Performance Scale   % Ambulation Activity Level Self-Care Intake Consciousness   100 Full Normal Full   Normal Full   90 Full No disease  Normal Full Normal Full dx/PNA. They stated that he has had aspiration in the past but has not been evaluated by SLP to have recommendations for a modified diet.      Hopes/Goals:   Terri Brown and Sue Dowell were in agreement that they want to continue with a treatment approach to care vomiting not specified, presence of nausea not specified, unspecified vomiting type     Pneumonia due to infectious organism, unspecified laterality, unspecified part of lung     Hypernatremia     Essential hypertension     Edema     Palliative care encoun ADDENDUM:     HCPOA: Document received from [place] [address] (at the [facility]). Review of document from 2015 noting [address] [place] [address] as Radnor, 1st successor Michelle Huertas and 2nd successor Fr. Missy Weber. Discussion with Fr. Elisha Ahumada explaining gail

## 2021-01-22 NOTE — ED NOTES
At this time, Pt's POA Father Prieto Reyes sends his best regards to patient but will not come to visit due to possible respiratory infection risk. Will inform floor of same.

## 2021-01-22 NOTE — ED PROVIDER NOTES
Patient Seen in: [facility] 5nw-a      History   Patient presents with:  Difficulty Breathing  Cough/URI  Fever    Stated Complaint: SOB, COUGH, FEVER    HPI/Subjective:   HPI    Patient sent in because of severe shortness of breath.   Was admitted i HPI.  Constitutional and vital signs reviewed. All other systems reviewed and negative except as noted above.     Physical Exam     ED Triage Vitals [01/21/21 1859]   /76   Pulse 109   Resp (!) 30   Temp (!) 101.1 °F (38.4 °C)   Temp src Temporal other components within normal limits   D-DIMER - Abnormal; Notable for the following components:    D-Dimer 1.69 (*)     All other components within normal limits   C-REACTIVE PROTEIN - Abnormal; Notable for the following components:    C-Reactive Protein -----------         ------                     CBC W/ DIFFERENTIAL[[id number]]          Abnormal            Final result                 Please view results for these tests on the individual orders.    TROPONIN I   LACTIC ACID 3 HR POST P recommended going ahead with the IV Decadron. He will also need to continue IV antibiotics for the UTI and any possible underlying bacterial process. Dr. Sharyn Louis follows this patient out patient and called in to discuss his care.   Admission disposition: 1/

## 2021-01-22 NOTE — PROGRESS NOTES
01/22/21 1100   Clinical Encounter Type   Visited With Health care provider   Continue Visiting No  (Palliative care handled this)

## 2021-01-22 NOTE — CM/SW NOTE
01/22/21 0933   CM/SW Screening   Referral Source Social Work (self-referral)   Information Source Chart review;Nursing rounds   Patient's [address] Name [facility]   CM/SW/Palliative Follow Up   Palliative ca

## 2021-01-22 NOTE — PLAN OF CARE
Problem: RESPIRATORY - ADULT  Goal: Achieves optimal ventilation and oxygenation  Description: INTERVENTIONS:  - Assess for changes in respiratory status  - Assess for changes in mentation and behavior  - Position to facilitate oxygenation and minimize r during mobilization  - Obtain PT/OT consults as needed  - Advance activity as appropriate  - Communicate ordered activity level and limitations with patient/family  Outcome: Progressing     Problem: Impaired Swallowing  Goal: Minimize aspiration risk  Desc

## 2021-01-22 NOTE — PHYSICAL THERAPY NOTE
PHYSICAL THERAPY QUICK EVALUATION - INPATIENT    Room Number: 212/074-W  Evaluation Date: 1/22/2021  Presenting Problem: respiratory distress  Physician Order: PT Eval and Treat    Problem List  Principal Problem:    Acute respiratory distress  Active Pr risk    WEIGHT BEARING RESTRICTION                   PAIN ASSESSMENT  Ratin         RANGE OF MOTION AND STRENGTH ASSESSMENT  See OT note for UE assessment      Lower extremity ROM is within functional limits     Lower extremity strength is within funct overall evaluation complexity is considered low. PT Discharge Recommendations: Long term care    PLAN  Patient has been evaluated and presents with no skilled Physical Therapy needs at this time. Patient discharged from Physical Therapy services.   Please

## 2021-01-22 NOTE — H&P
[facility]    History & Physical    Claude Robins Patient Status:  Inpatient    1926 MRN [id number]   [facility] 5NW-A Attending Katherine Holbrook MD   Hosp Day # 1 PCP Mike Cantrell MD     Date:  2021  Date of Admission:  1 • OTHER SURGICAL HISTORY      surgery testis     Family History   Problem Relation Age of Onset   • Cancer Father         laryngeal/throat   • Stroke Mother         stroke syndrome   • Other (Other) Brother         cutaneous gangrene     Social History: 75  Resp:  [20-35] 22  BP: (105-159)/(51-95) 149/51    Intake/Output:    Intake/Output Summary (Last 24 hours) at 1/22/2021 1200  Last data filed at 1/22/2021 0820  Gross per 24 hour   Intake 350 ml   Output 1250 ml   Net -900 ml       Exam  General Appear Dictated by (CST): Simran Rodriguez MD on 1/21/2021 at 7:34 PM     Finalized by (CST): Simran Rodriguez MD on 1/21/2021 at 7:38 PM       Ekg 12-lead    Result Date: 1/22/2021  Baseline wander Normal sinus rhythm Left axis deviation Nonspecific intraventricul Osteoarthrosis, unspecified whether generalized or localized, unspecified site     Pulmonary collapse     Other and unspecified hyperlipidemia     Numbness of legs     Ear problem     Weakness     Fever     Weakness generalized     Fever, unspecified fever Certification    Patient will require inpatient services that will reasonably be expected to span two midnight's based on the clinical documentation in H+P.    Based on patients current state of illness, I anticipate that, after discharge, patient will requ

## 2021-01-22 NOTE — VIDEO SWALLOW STUDY NOTE
ADULT VIDEOFLUOROSCOPIC SWALLOWING STUDY    Admission Date: 1/21/2021  Evaluation Date: 01/22/21  Radiologist: Dr. Umesh Vail: Gavin;  May consider therapeutic trials of soft/chopped as part of dysphagia tx prior disease [facility])      Past Medical History  Past Medical History:   Diagnosis Date   • Atherosclerotic heart disease of native coronary artery without angina pectoris    • BPH (benign prostatic hyperplasia)    • Cellulitis and abscess of unspecified site    • Aspiration: None  PUREE  Oral Phase of Swallow (VFSS - Puree): Within Functional Limits  Laryngeal Penetration: None  Tracheal Aspiration: None  SOFT SOLID  Oral Phase of Swallow (VFSS - Soft Solid):  Within Functional Limits  Laryngeal Penetration: None  T

## 2021-01-22 NOTE — OCCUPATIONAL THERAPY NOTE
OCCUPATIONAL THERAPY QUICK EVALUATION - INPATIENT    Room Number: 484/503-V  Evaluation Date: 1/22/2021     Type of Evaluation: Quick Eval  Presenting Problem: acute respiratory distress    Physician Order: IP Consult to Occupational Therapy  Reason for Th nursing facility([facility])  Home Layout: One level  Lives With: Staff 24 hours               Occupation/Status: retired      Drives: No       Prior Level of Function: Per chart review pt is LTC resident and requires assist for all ADLs and in non-am completed transfers sit <> stand MIN A and stand pivot transfer from bed to chair utilizing hand held assist.   Pt instructed in LB dressing rafy/doffing socks at MAX A.      Pt left in seated position in chair with all needs met, call light within reach, R

## 2021-01-22 NOTE — COVID NURSING ASSESSMENT
COVID-19 Daily Discharge Readiness-Nursing    O2 Sat at Rest:  95% on room air  O2 Sat with Exertion:    % on    liters   Temperature max from last 24 hrs: Temp (24hrs), Av °F (37.2 °C), Min:97.6 °F (36.4 °C), Max:101.1 °F (38.4 °C)    Inflammatory Mar

## 2021-01-22 NOTE — SLP NOTE
ADULT SWALLOWING EVALUATION    ASSESSMENT    ASSESSMENT/OVERALL IMPRESSION:  Order received for BSE; chart reviewed. This is a 80 y.o male from [address] [place] [address] who presented to ED yesterday due to SOB, cough, fever.  Pt with recent external COVID-19 diagnosis and sips  Aspiration Precautions: Upright position; Slow rate;Small bites and sips; No straw  Medication Administration Recommendations: One pill at a time; Whole in puree;Crushed in puree  Treatment Plan/Recommendations: Videofluoroscopic swallow study  Disc of Presentation: Staff/Clinician assistance  Patient Positioning: Upright;Midline;Standard chair    Oral Phase of Swallow: Within Functional Limits   Pharyngeal Phase of Swallow:  Within Functional Limits           (Please note: Silent aspiration cannot be

## 2021-01-22 NOTE — ED INITIAL ASSESSMENT (HPI)
Per EMS staff, nebulizer was given en route. Pt began coughing this AM. At 5p he began to wheeze. Diagnosed with COVID December 2020. Febrile for EMS staff. Pt has received COVID vaccine and tylenol was given for fever PTA.  High pitched, wheezing cough not

## 2021-01-22 NOTE — COVID NURSING ASSESSMENT
COVID-19 Daily Discharge Readiness-Nursing    O2 Sat at Rest: 100% on BiPAP   Temperature max from last 24 hrs: Temp (24hrs), Av.4 °F (38 °C), Min:99.6 °F (37.6 °C), Max:101.1 °F (38.4 °C)    Inflammatory Markers:   Recent Labs   Lab 21  [id number]

## 2021-01-22 NOTE — PROGRESS NOTES
NURSING ADMISSION NOTE      Patient admitted via Cart  Oriented to room. Safety precautions initiated. Bed in low position. Call light in reach. Pt admitted to floor & admission navigator completed.   Call light within reach, pt has no questions a

## 2021-01-22 NOTE — ED NOTES
Pt is resting comfortably with eyes closed at this time, but awakens to his name being spoken and gentle touch.

## 2021-01-23 LAB
ALBUMIN SERPL-MCNC: 2.7 G/DL (ref 3.4–5)
ALBUMIN/GLOB SERPL: 0.6 {RATIO} (ref 1–2)
ALP LIVER SERPL-CCNC: 69 U/L
ALT SERPL-CCNC: 14 U/L
ANION GAP SERPL CALC-SCNC: 5 MMOL/L (ref 0–18)
AST SERPL-CCNC: 23 U/L (ref 15–37)
BASOPHILS # BLD AUTO: 0.01 X10(3) UL (ref 0–0.2)
BASOPHILS NFR BLD AUTO: 0.1 %
BILIRUB SERPL-MCNC: 0.3 MG/DL (ref 0.1–2)
BUN BLD-MCNC: 57 MG/DL (ref 7–18)
BUN/CREAT SERPL: 29.2 (ref 10–20)
CALCIUM BLD-MCNC: 9.2 MG/DL (ref 8.5–10.1)
CHLORIDE SERPL-SCNC: 105 MMOL/L (ref 98–112)
CO2 SERPL-SCNC: 28 MMOL/L (ref 21–32)
CREAT BLD-MCNC: 1.95 MG/DL
DEPRECATED HBV CORE AB SER IA-ACNC: 96.6 NG/ML
DEPRECATED RDW RBC AUTO: 42.6 FL (ref 35.1–46.3)
EOSINOPHIL # BLD AUTO: 0 X10(3) UL (ref 0–0.7)
EOSINOPHIL NFR BLD AUTO: 0 %
ERYTHROCYTE [DISTWIDTH] IN BLOOD BY AUTOMATED COUNT: 13.6 % (ref 11–15)
GLOBULIN PLAS-MCNC: 4.2 G/DL (ref 2.8–4.4)
GLUCOSE BLD-MCNC: 108 MG/DL (ref 70–99)
HAV IGM SER QL: 2.2 MG/DL (ref 1.6–2.6)
HCT VFR BLD AUTO: 24.8 %
HGB BLD-MCNC: 8.1 G/DL
IMM GRANULOCYTES # BLD AUTO: 0.15 X10(3) UL (ref 0–1)
IMM GRANULOCYTES NFR BLD: 1.4 %
IRON SATURATION: 7 %
IRON SERPL-MCNC: 20 UG/DL
LYMPHOCYTES # BLD AUTO: 0.68 X10(3) UL (ref 1–4)
LYMPHOCYTES NFR BLD AUTO: 6.5 %
M PROTEIN MFR SERPL ELPH: 6.9 G/DL (ref 6.4–8.2)
MCH RBC QN AUTO: 28.3 PG (ref 26–34)
MCHC RBC AUTO-ENTMCNC: 32.7 G/DL (ref 31–37)
MCV RBC AUTO: 86.7 FL
MONOCYTES # BLD AUTO: 0.66 X10(3) UL (ref 0.1–1)
MONOCYTES NFR BLD AUTO: 6.3 %
NEUTROPHILS # BLD AUTO: 9.04 X10 (3) UL (ref 1.5–7.7)
NEUTROPHILS # BLD AUTO: 9.04 X10(3) UL (ref 1.5–7.7)
NEUTROPHILS NFR BLD AUTO: 85.7 %
OSMOLALITY SERPL CALC.SUM OF ELEC: 302 MOSM/KG (ref 275–295)
PLATELET # BLD AUTO: 216 10(3)UL (ref 150–450)
POTASSIUM SERPL-SCNC: 4.6 MMOL/L (ref 3.5–5.1)
RBC # BLD AUTO: 2.86 X10(6)UL
SODIUM SERPL-SCNC: 138 MMOL/L (ref 136–145)
TOTAL IRON BINDING CAPACITY: 298 UG/DL (ref 240–450)
TRANSFERRIN SERPL-MCNC: 200 MG/DL (ref 200–360)
WBC # BLD AUTO: 10.5 X10(3) UL (ref 4–11)

## 2021-01-23 PROCEDURE — 99233 SBSQ HOSP IP/OBS HIGH 50: CPT | Performed by: INTERNAL MEDICINE

## 2021-01-23 NOTE — PROGRESS NOTES
[facility]                INFECTIOUS DISEASE PROGRESS NOTE    Otwell Najma Patient Status:  Inpatient    1926 MRN [id number]   [facility] 5NW-A Attending Palmer Rodriguez MD   Hosp Day # 2 PCP Román Cabrera MD     Antibiot GFRNAA 23*   < > 24* 23* 29*   CA 9.2   < > 9.2 9.4 9.2   ALB 3.1*  --  2.9*  --  2.7*      < > 136 137 138   K 5.1   < > 5.9* 4.5 4.6      < > 106 103 105   CO2 26.0   < > 27.0 29.0 28.0   ALKPHO 89  --  79  --  69   AST 20  --  18  --  23 COVID-19 virus     Urinary tract infection without hematuria, site unspecified     EDMUND (acute kidney injury) ([facility] [address]     Stage 4 chronic kidney disease (HCC)            ASSESSMENT/PLAN:  1.  COVID  -per [facility] notes a CXR dated on 12/6 was ordered for diagno

## 2021-01-23 NOTE — PROGRESS NOTES
[facility] Pulmonary/Critical Care Progress Note     SUBJECTIVE/Interval history: All events, procedures, notes reviewed. No acute events overnight, he feels 'okay' presently, denies cough, dyspnea or pain.  Remains on R 9.4 9.2    137 138   K 5.9* 4.5 4.6    103 105   CO2 27.0 29.0 28.0     Recent Labs   Lab 01/21/21  1919 01/22/21  0240 01/23/21  0711   RBC 3.38* 3.01* 2.86*   HGB 9.3* 8.5* 8.1*   HCT 29.5* 26.5* 24.8*   MCV 87.3 88.0 86.7   MCH 27.5 28.2 28. BLOODURINE Large*   PHURINE 5.0   PROUR 100 *   UROBILINOGEN <2.0   NITRITE Negative   LEUUR Large*   WBCUR >50*   RBCUR >10*   BACUR Rare*   EPIUR None Seen       Interval Radiology:   Xr Video Swallow (cpt=74230)    Result Date: 1/22/2021  PROCEDURE: Nonspecific patchy mixed interstitial and airspace opacities upper and lower right lung and lower left lung. Edema versus atypical pneumonia. Cardiomegaly with left ventricular prominence. Vascularity mildly congested.   Tortuous ectatic  and calcified a

## 2021-01-23 NOTE — PROGRESS NOTES
ID requesting accurate COVID results for pt. [facility] called and nursing supervisor, Mo Meza was unavailable. Message left with  to call RN back regarding COVID results.  Will follow     [phone] [facility] called back with COVID results and faxed ove

## 2021-01-23 NOTE — PROGRESS NOTES
Pt pleasantly confused. Oriented x 1. VSS. Maintaining 02 sats on room air. Wet, congested cough. Aspiration precautions in place. NSR on tele. Pt tolerating modified diet with no c/o n/v/d. Chronic alvarado. No c/o pain. Decadron stopped per ID.  Isolation di

## 2021-01-23 NOTE — RESPIRATORY THERAPY NOTE
Patient doing well on room air, breath sounds clear, no cough or congestion. No indication for CPT. Will encourage IS use.

## 2021-01-23 NOTE — PROGRESS NOTES
[facility]  Progress Note    Fernando Ardon Patient Status:  Inpatient    1926 MRN [id number]   [facility] 5NW-A Attending Duane Her MD   Hosp Day # 2 PCP Yadira Steele MD         SUBJECTIVE:  Subjective:  Fernando Ardon --        No results for input(s): PGLU in the last 168 hours. No results for input(s): URINE, CULTI, BLDSMR in the last 168 hours. Hospital Encounter on 01/21/21   1.  URINE CULTURE, ROUTINE     Status: Abnormal    Collection Time: 01/21/21  7:29 PM TECHNIQUE:  Unenhanced multislice CT scanning from above the kidneys to below the urinary bladder. 2D rendering are generated on the CT scanner workstation to localize potential stones in the cranio-caudal plane. Dose reduction techniques were used.  Dose CONCLUSION:  No evidence of renal stones or hydronephrosis. There is a Leo catheter within the urinary bladder which is decompressed, limiting evaluation. There is probable bladder wall thickening present which may represent cystitis.   Clinical cor abnormal glucose     Coronary atherosclerosis of unspecified type of vessel, native or graft     Generalized and unspecified atherosclerosis     Intermediate coronary syndrome (HCC)     Unspecified disorder of kidney and ureter     Osteoarthrosis, unspecif enterococcus infection, elevated PCT, leukocytosis, history of urinary retention, hydronephrosis, a repeat CAT scan, Leo drainage, IV antibiotics       EDMUND (acute kidney injury) ([facility] [address]  IV fluids       Stage 4 chronic kidney disease (HCC)  Monitor, renal

## 2021-01-23 NOTE — PROGRESS NOTES
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     Meds:     •  dexamethasone Sodium Phosphate (DECADRON) 4 MG/ML injection 6 mg, 6 mg, Intravenous, Q24H    •  escitalopram (LEXAPRO) tablet 5 mg, 5 mg, Oral, Daily    •  aspirin chewable tab 81 mg, 81 mg, Oral, Daily    •  amLODIPine Be

## 2021-01-24 LAB
ALBUMIN SERPL-MCNC: 2.9 G/DL (ref 3.4–5)
ALBUMIN/GLOB SERPL: 0.7 {RATIO} (ref 1–2)
ALP LIVER SERPL-CCNC: 69 U/L
ALT SERPL-CCNC: 22 U/L
ANION GAP SERPL CALC-SCNC: 6 MMOL/L (ref 0–18)
AST SERPL-CCNC: 36 U/L (ref 15–37)
BASOPHILS # BLD AUTO: 0.01 X10(3) UL (ref 0–0.2)
BASOPHILS NFR BLD AUTO: 0.1 %
BILIRUB SERPL-MCNC: 0.4 MG/DL (ref 0.1–2)
BUN BLD-MCNC: 56 MG/DL (ref 7–18)
BUN/CREAT SERPL: 27.6 (ref 10–20)
CALCIUM BLD-MCNC: 9.8 MG/DL (ref 8.5–10.1)
CHLORIDE SERPL-SCNC: 109 MMOL/L (ref 98–112)
CO2 SERPL-SCNC: 27 MMOL/L (ref 21–32)
CREAT BLD-MCNC: 2.03 MG/DL
DEPRECATED RDW RBC AUTO: 43.9 FL (ref 35.1–46.3)
EOSINOPHIL # BLD AUTO: 0 X10(3) UL (ref 0–0.7)
EOSINOPHIL NFR BLD AUTO: 0 %
ERYTHROCYTE [DISTWIDTH] IN BLOOD BY AUTOMATED COUNT: 13.5 % (ref 11–15)
GLOBULIN PLAS-MCNC: 4.2 G/DL (ref 2.8–4.4)
GLUCOSE BLD-MCNC: 91 MG/DL (ref 70–99)
HCT VFR BLD AUTO: 27.8 %
HGB BLD-MCNC: 8.6 G/DL
IMM GRANULOCYTES # BLD AUTO: 0.15 X10(3) UL (ref 0–1)
IMM GRANULOCYTES NFR BLD: 1.6 %
LYMPHOCYTES # BLD AUTO: 0.99 X10(3) UL (ref 1–4)
LYMPHOCYTES NFR BLD AUTO: 10.3 %
M PROTEIN MFR SERPL ELPH: 7.1 G/DL (ref 6.4–8.2)
MCH RBC QN AUTO: 27.6 PG (ref 26–34)
MCHC RBC AUTO-ENTMCNC: 30.9 G/DL (ref 31–37)
MCV RBC AUTO: 89.1 FL
MONOCYTES # BLD AUTO: 0.65 X10(3) UL (ref 0.1–1)
MONOCYTES NFR BLD AUTO: 6.8 %
NEUTROPHILS # BLD AUTO: 7.82 X10 (3) UL (ref 1.5–7.7)
NEUTROPHILS # BLD AUTO: 7.82 X10(3) UL (ref 1.5–7.7)
NEUTROPHILS NFR BLD AUTO: 81.2 %
OSMOLALITY SERPL CALC.SUM OF ELEC: 309 MOSM/KG (ref 275–295)
PLATELET # BLD AUTO: 218 10(3)UL (ref 150–450)
POTASSIUM SERPL-SCNC: 4.2 MMOL/L (ref 3.5–5.1)
RBC # BLD AUTO: 3.12 X10(6)UL
SODIUM SERPL-SCNC: 142 MMOL/L (ref 136–145)
WBC # BLD AUTO: 9.6 X10(3) UL (ref 4–11)

## 2021-01-24 PROCEDURE — 99232 SBSQ HOSP IP/OBS MODERATE 35: CPT | Performed by: INTERNAL MEDICINE

## 2021-01-24 NOTE — PLAN OF CARE
Received patient a/ox1, pleasantly confused, very restless overnight. O2 sats 93% on RA. No new complaints. Safety measures in place and frequent rounding provided.       Problem: RESPIRATORY - ADULT  Goal: Achieves optimal ventilation and oxygenation  Desc w/ or w/o assistive devices  - Assist with transfers and ambulation using safe patient handling equipment as needed  - Ensure adequate protection for wounds/incisions during mobilization  - Obtain PT/OT consults as needed  - Advance activity as appropriate

## 2021-01-24 NOTE — PROGRESS NOTES
Pt is AOx1, pleasantly confused. Reorientation PRN. Follows commands. Did not get much sleep last night, resting comfortably in the chair. VSS, afebrile. Maintaining sp02 >95% on RA at rest and with exertion. Frequent strong, wet, rattley dry cough.  Tele

## 2021-01-24 NOTE — PROGRESS NOTES
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     Meds:     •  influenza vaccine (PF) (FLUZONE HD) high dose for 65 yrs & older inj 0.7ml, 0.7 mL, Intramuscular, Prior to dis

## 2021-01-24 NOTE — PROGRESS NOTES
[facility]                INFECTIOUS DISEASE PROGRESS NOTE    Bev Ricketts Patient Status:  Inpatient    1926 MRN [id number]   [facility] 5NW-A Attending Myke Gomez MD   Hosp Day # 3 PCP Garrett Murguia MD     Antibiot 5. 9* 4.5 4.6 4.2    103 105 109   CO2 27.0 29.0 28.0 27.0   ALKPHO 79  --  69 69   AST 18  --  23 36   ALT 15*  --  14* 22   BILT 0.4  --  0.3 0.4   TP 7.2  --  6.9 7.1       No results found for: [facility] Encounter on 01/21/21 before 12/6  -per [facility] notes a CXR dated on 12/6 was ordered for diagnosis of \"COVID\" but there is no documentation of the date of positive result. An \"[facility] EXTERNAL COVID\" was entered as \"12/31\" based on a report that had \"COVID IN [facility]".

## 2021-01-24 NOTE — PROGRESS NOTES
[facility] Pulmonary/Critical Care Progress Note     SUBJECTIVE/Interval history: All events, procedures, notes reviewed. No acute events overnight, he feels well presently, denies cough, dyspnea or pain.  Remains on RA 103 105 109   CO2 29.0 28.0 27.0     Recent Labs   Lab 01/22/21  0240 01/23/21  0711 01/24/21  0736   RBC 3.01* 2.86* 3.12*   HGB 8.5* 8.1* 8.6*   HCT 26.5* 24.8* 27.8*   MCV 88.0 86.7 89.1   MCH 28.2 28.3 27.6   MCHC 32.1 32.7 30.9*   RDW 13.4 13.6 13.5 *   UROBILINOGEN <2.0   NITRITE Negative   LEUUR Large*   WBCUR >50*   RBCUR >10*   BACUR Rare*   EPIUR None Seen       Interval Radiology:   Xr Video Swallow (cpt=74230)    Result Date: 1/22/2021  PROCEDURE:  XR VIDEO SWALLOW (CPT=74230)  TECHNIQUE:  Vide opacities upper and lower right lung and lower left lung. Edema versus atypical pneumonia. Cardiomegaly with left ventricular prominence. Vascularity mildly congested. Tortuous ectatic  and calcified aorta.    Dictated by (CST): Markie Diaz MD on 1/

## 2021-01-25 VITALS
OXYGEN SATURATION: 96 % | WEIGHT: 120.88 LBS | BODY MASS INDEX: 24 KG/M2 | DIASTOLIC BLOOD PRESSURE: 64 MMHG | HEART RATE: 59 BPM | RESPIRATION RATE: 20 BRPM | TEMPERATURE: 97 F | SYSTOLIC BLOOD PRESSURE: 137 MMHG

## 2021-01-25 LAB
ALBUMIN SERPL-MCNC: 2.8 G/DL (ref 3.4–5)
ALBUMIN/GLOB SERPL: 0.7 {RATIO} (ref 1–2)
ALP LIVER SERPL-CCNC: 68 U/L
ALT SERPL-CCNC: 25 U/L
ANION GAP SERPL CALC-SCNC: 5 MMOL/L (ref 0–18)
AST SERPL-CCNC: 30 U/L (ref 15–37)
BASOPHILS # BLD AUTO: 0.03 X10(3) UL (ref 0–0.2)
BASOPHILS NFR BLD AUTO: 0.4 %
BILIRUB SERPL-MCNC: 0.4 MG/DL (ref 0.1–2)
BUN BLD-MCNC: 57 MG/DL (ref 7–18)
BUN/CREAT SERPL: 29.5 (ref 10–20)
CALCIUM BLD-MCNC: 9 MG/DL (ref 8.5–10.1)
CHLORIDE SERPL-SCNC: 109 MMOL/L (ref 98–112)
CO2 SERPL-SCNC: 28 MMOL/L (ref 21–32)
CREAT BLD-MCNC: 1.93 MG/DL
DEPRECATED RDW RBC AUTO: 43.6 FL (ref 35.1–46.3)
EOSINOPHIL # BLD AUTO: 0.18 X10(3) UL (ref 0–0.7)
EOSINOPHIL NFR BLD AUTO: 2.2 %
ERYTHROCYTE [DISTWIDTH] IN BLOOD BY AUTOMATED COUNT: 13.5 % (ref 11–15)
GLOBULIN PLAS-MCNC: 3.9 G/DL (ref 2.8–4.4)
GLUCOSE BLD-MCNC: 80 MG/DL (ref 70–99)
HCT VFR BLD AUTO: 28.7 %
HGB BLD-MCNC: 9 G/DL
IMM GRANULOCYTES # BLD AUTO: 0.13 X10(3) UL (ref 0–1)
IMM GRANULOCYTES NFR BLD: 1.6 %
LYMPHOCYTES # BLD AUTO: 1.08 X10(3) UL (ref 1–4)
LYMPHOCYTES NFR BLD AUTO: 13.4 %
M PROTEIN MFR SERPL ELPH: 6.7 G/DL (ref 6.4–8.2)
MCH RBC QN AUTO: 27.5 PG (ref 26–34)
MCHC RBC AUTO-ENTMCNC: 31.4 G/DL (ref 31–37)
MCV RBC AUTO: 87.8 FL
MONOCYTES # BLD AUTO: 0.78 X10(3) UL (ref 0.1–1)
MONOCYTES NFR BLD AUTO: 9.7 %
NEUTROPHILS # BLD AUTO: 5.86 X10 (3) UL (ref 1.5–7.7)
NEUTROPHILS # BLD AUTO: 5.86 X10(3) UL (ref 1.5–7.7)
NEUTROPHILS NFR BLD AUTO: 72.7 %
OSMOLALITY SERPL CALC.SUM OF ELEC: 309 MOSM/KG (ref 275–295)
PLATELET # BLD AUTO: 223 10(3)UL (ref 150–450)
POTASSIUM SERPL-SCNC: 4 MMOL/L (ref 3.5–5.1)
RBC # BLD AUTO: 3.27 X10(6)UL
SODIUM SERPL-SCNC: 142 MMOL/L (ref 136–145)
WBC # BLD AUTO: 8.1 X10(3) UL (ref 4–11)

## 2021-01-25 PROCEDURE — 99231 SBSQ HOSP IP/OBS SF/LOW 25: CPT | Performed by: CLINICAL NURSE SPECIALIST

## 2021-01-25 PROCEDURE — 99232 SBSQ HOSP IP/OBS MODERATE 35: CPT | Performed by: INTERNAL MEDICINE

## 2021-01-25 RX ORDER — AMOXICILLIN 500 MG/1
500 CAPSULE ORAL 2 TIMES DAILY
Qty: 10 CAPSULE | Refills: 0 | Status: SHIPPED | OUTPATIENT
Start: 2021-01-25 | End: 2021-01-30

## 2021-01-25 NOTE — CM/SW NOTE
Patient will return to [facility]  [id number] today at [address] [facility] via [facility]. MSW updated Fouzia Ricketts from [address] [place] [address]. PCS form completed in Epic. Message left for patient's HCPOA.     Marilin Mendez LCSW

## 2021-01-25 NOTE — PROGRESS NOTES
Patient is oriented to self, confused. Has not been sleeping for 2 nights, very restless during days and overnights. Currently resting comfortably in bed, slept majority of the morning. VSS, afebrile. Maintaining spO2>90% on RA at rest and with ambulation.

## 2021-01-25 NOTE — DISCHARGE PLANNING
NURSING DISCHARGE NOTE    Discharged to [address] [place] [address] via Ambulance. Accompanied by Support staff  Belongings Taken by patient/family. Discharge navigator complete.    Discharge instructions reviewed, report called to [facility], RN at [place] [id number]

## 2021-01-25 NOTE — PROGRESS NOTES
[facility]                INFECTIOUS DISEASE PROGRESS NOTE    Kierra Moyer Patient Status:  Inpatient    1926 MRN [id number]   [facility] 5NW-A Attending Anderson Pacheco MD   Hosp Day # 4 PCP Joselyn Bryson MD     Antibiot ALKPHO 69 69 68   AST 23 36 30   ALT 14* 22 25   BILT 0.3 0.4 0.4   TP 6.9 7.1 6.7       No results found for: [facility] Encounter on 01/21/21   1.  URINE CULTURE, ROUTINE     Status: Abnormal    Collection Time: 01/21/21  7:29 PM COVID\" was entered as \"12/31\" based on a report that had \"COVID IN [facility]". Asked for that to be corrected    2.  UTI enterooccus on zosyn  Po for dc        Christina Castillo MD  [facility]  [phone]

## 2021-01-25 NOTE — PROGRESS NOTES
[facility]  Progress Note    Antwan Elliott Patient Status:  Inpatient    1926 MRN [id number]   [facility] 5NW-A Attending Rosa Edmonds MD   Hosp Day # 4 PCP Maria L Cespedes MD         SUBJECTIVE:  Subjective:  Antwan Elliott ALB 3.1* 2.9* 2.7* 2.9* 2.8*     --   --   --   --        No results for input(s): PGLU in the last 168 hours. No results for input(s): URINE, CULTI, BLDSMR in the last 168 hours. Hospital Encounter on 01/21/21   1.  URINE CULTURE, ROUTINE ORAL)(CPT=74176), 11/04/2020, 12:09 PM.  INDICATIONS:  uti sepsis  TECHNIQUE:  Unenhanced multislice CT scanning from above the kidneys to below the urinary bladder.   2D rendering are generated on the CT scanner workstation to localize potential stones in thickening present. Consider correlation for cystitis. CONCLUSION:  No evidence of renal stones or hydronephrosis. There is a Leo catheter within the urinary bladder which is decompressed, limiting evaluation.   There is probable bladder wa virus vaccine PF, PEG 3350, acetaminophen, acetaminophen, ondansetron HCl       Assessment/Plan:   Patient Active Problem List:     Other abnormal glucose     Coronary atherosclerosis of unspecified type of vessel, native or graft     Generalized and unspe air, monitoring off decadron, covid infection likely old from December      Acute respiratory failure with hypoxia–resolved, on room air, monitor for now, pulm follow up        Urinary tract infection without hematuria, site unspecified  History of enteroc

## 2021-01-25 NOTE — PLAN OF CARE
Patient is alert and oriented to self. Very impulsive. WDL on RA. NS on tele. VSS. Subcutaneous heparin. Chronic alvarado in place for retention. IV abx. 1:1 feed assist. Up with 1 assist and a walker. Denies pain. Safety precautions in place.  Video monitorin orders  - Initiate isolation precautions as appropriate  - Initiate Pressure Ulcer prevention bundle as indicated  1/25/2021 0052 by Luzmaria Kebede RN  Outcome: Progressing  1/25/2021 0030 by Luzmaria Kebede RN  Outcome: Progressing     Problem: M Progressing  Goal: Patient/Family Short Term Goal  Description: Patient's Short Term Goal:   1/22 NOC: Remain safe & wean O2 as able  1/23 AM Remain safe and continue on Room Air  124 AM: get some sleep  1/24 pm: safety    Interventions:   - IS & cough and

## 2021-01-25 NOTE — PROGRESS NOTES
[address] [facility] [address] Follow Up    Rhona Pires  [id number]  Patient seen at: [facility] Day #4    Subjective:      Deferred as patient is sleeping.  RN requested to allow him to rest as he has not been sleeping at  108 (90 Base) MCG/ACT inhaler 8 puff, 8 puff, Inhalation, Q4H [address]  No current outpatient medications on file.     Labs/ imaging     Hematology:  Lab Results   Component Value Date    WBC 8.1 01/25/2021    HGB 9.0 (L) 01/25/2021    HCT 28.7 (L) 01/25/2021 3:11 PM     CT ABDOMEN+PELVIS KIDNEYSTONE 2D RNDR(NO IV,NO ORAL)(CPT=74176)  Narrative: PROCEDURE:  CT ABDOMEN+PELVIS KIDNEYSTONE 2D RNDR(NO IV,NO ORAL)(CPT=74176)     COMPARISON:  [facility] , CT, CT ABDOMEN+PELVIS KIDNEYSTONE 2D RNDR(NO IV,NO ORAL)(CPT=74090 hernia containing fat as well as a few loops of small bowel. No evidence of obstruction. Small to moderate left inguinal hernia present. There is a Leo catheter within the urinary bladder which is decompressed, limiting evaluation.   However there i Reduced Drowsy/confused   20 Bedbound Extensive Disease  Can't do any work Max Assist  Total Care Minimal Drowsy/confused   10 Bedbound/coma Extensive Disease  Can't do any work  coma  Max Assist  Total Care Mouth care Drowsy or coma   0 Death       Psycho unspecified type of vessel, native or graft     Generalized and unspecified atherosclerosis     Intermediate coronary syndrome (HCC)     Unspecified disorder of kidney and ureter     Osteoarthrosis, unspecified whether generalized or localized, unspecified counseling and coordinating care. Thank you for inviting Palliative Care Service to participate in the care of Enio Music and family. We will continue to follow peripherally pending decision regarding community PC.      Above plan reviewed with

## 2021-01-25 NOTE — PROGRESS NOTES
[facility]  Progress Note    Dawson Mata Patient Status:  Inpatient    1926 MRN [id number]   [facility] 5NW-A Attending Onle Galan MD   Hosp Day # 4 PCP Neeta Medel MD     Limited historian  Responds appropriately to v 20, weight 120 lb 14.4 oz (54.8 kg), SpO2 94 %. General: Awake; alert; oriented to person   HEENT: dry mucous membranes. EOM-I. PERR  Neck: No lymphadenopathy. No JVD. No carotid bruits.   Respiratory : decreased breath sound B  Cardiovascular: S1, S2.  R

## 2021-01-25 NOTE — CM/SW NOTE
Updates sent to [facility]  [phone]  K:[phone] via [facility]. Will return to LTC when medically cleared for discharge.     Esa vEans LCSW

## 2021-01-25 NOTE — DISCHARGE SUMMARY
[facility]  Discharge Summary    Adela Burrows Patient Status:  Inpatient    1926 MRN [id number]   [facility] 5NW-A Attending Courtney Norton MD   [facility] Day # 4 PCP Dagoberto Cross MD     Date of Admission: 2021    Date of Jayden Diaz administered orally with patient in lateral projection. COMPARISON:  None.   INDICATIONS:  hx dysphagia; increased coughing after PO intake; r/o aspiration;  +COVID-19  PATIENT STATED HISTORY: (As transcribed by Technologist)  Patient offered no additional Liver, spleen, pancreas and adrenal glands have an essentially unremarkable noncontrast appearance. Hyperdensity within the gallbladder likely represents sludge and stones. No evidence of hydronephrosis or renal stones.   There has been resolution of pre (As transcribed by Technologist)  Shortness of breath, cough and fever. Recently Covid19 positive. CONCLUSION:    Nonspecific patchy mixed interstitial and airspace opacities upper and lower right lung and lower left lung.   Edema versus atypi Neurologic :  General weakness,                                                        Data Review:       Labs:              Recent Labs   Lab 01/21/21  1919 01/22/21  0240 01/23/21  0711 01/24/21  0736 01/25/21  0541   WBC 10.8 14.7* 10 increased coughing after PO intake; r/o aspiration;  +COVID-19  PATIENT STATED HISTORY: (As transcribed by Technologist)  Patient offered no additional information at this itme.    CINE CAPTURES:  7 FLUORSCOPY TIME:  1 minute 9 seconds RADIATION DOSE (AIR K Hyperdensity within the gallbladder likely represents sludge and stones. No evidence of hydronephrosis or renal stones. There has been resolution of previously seen right hydronephrosis.   A cyst of the left kidney within the upper pole measures 3.1 x 2.4    CONCLUSION:    Nonspecific patchy mixed interstitial and airspace opacities upper and lower right lung and lower left lung. Edema versus atypical pneumonia. Cardiomegaly with left ventricular prominence. Vascularity mildly congested.   Tortu goals of care     DNAR (do not attempt resuscitation)     DNI (do not intubate)     Acute respiratory distress     Pneumonia due to COVID-19 virus     Urinary tract infection without hematuria, site unspecified     EDMUND (acute kidney injury) ([facility] [address]     Stage OT      Consultations:   please refer to chart for details  Procedures:  Please refer to chart for details    Disposition: SNF    Discharge Condition: Stable    Discharge Medications: Current Discharge Medication List    CONTINUE these medications which ha

## 2021-01-27 NOTE — CDS QUERY
Covid-19 Status Clarification  CLINICAL DOCUMENTATION CLARIFICATION FORM    Dear Doctor Mariel Davies information (provided below) includes documentation of a Covid-19 Infection.  For accurate ICD-10-CM code assignment to reflect severity of illness and Gloria Blackburn RN/BSN  [phone] [facility]                      Thank you!                                                    THIS FORM IS A PERMANENT PART OF THE MEDICAL RECORD

## 2021-03-06 ENCOUNTER — HOSPITAL ENCOUNTER (INPATIENT)
Facility: HOSPITAL | Age: 86
LOS: 3 days | Discharge: NURSING, REHAB OR LONG-TERM CARE | DRG: 177 | End: 2021-03-09
Attending: EMERGENCY MEDICINE | Admitting: INTERNAL MEDICINE
Payer: MEDICARE

## 2021-03-06 ENCOUNTER — APPOINTMENT (OUTPATIENT)
Dept: GENERAL RADIOLOGY | Facility: HOSPITAL | Age: 86
DRG: 177 | End: 2021-03-06
Attending: EMERGENCY MEDICINE
Payer: MEDICARE

## 2021-03-06 DIAGNOSIS — J18.9 SEPSIS DUE TO PNEUMONIA (HCC): ICD-10-CM

## 2021-03-06 DIAGNOSIS — A41.9 SEPSIS DUE TO PNEUMONIA (HCC): ICD-10-CM

## 2021-03-06 DIAGNOSIS — J69.0 ASPIRATION PNEUMONIA, UNSPECIFIED ASPIRATION PNEUMONIA TYPE, UNSPECIFIED LATERALITY, UNSPECIFIED PART OF LUNG (HCC): Primary | ICD-10-CM

## 2021-03-06 DIAGNOSIS — J96.01 ACUTE HYPOXEMIC RESPIRATORY FAILURE (HCC): ICD-10-CM

## 2021-03-06 LAB
ALBUMIN SERPL-MCNC: 2.9 G/DL (ref 3.4–5)
ALBUMIN/GLOB SERPL: 0.7 {RATIO} (ref 1–2)
ALP LIVER SERPL-CCNC: 89 U/L
ALT SERPL-CCNC: 19 U/L
ANION GAP SERPL CALC-SCNC: 5 MMOL/L (ref 0–18)
APTT PPP: 34.4 SECONDS (ref 25.4–36.1)
AST SERPL-CCNC: 22 U/L (ref 15–37)
BASOPHILS # BLD AUTO: 0.06 X10(3) UL (ref 0–0.2)
BASOPHILS NFR BLD AUTO: 0.4 %
BILIRUB SERPL-MCNC: 0.3 MG/DL (ref 0.1–2)
BUN BLD-MCNC: 43 MG/DL (ref 7–18)
BUN/CREAT SERPL: 19.1 (ref 10–20)
CALCIUM BLD-MCNC: 9.4 MG/DL (ref 8.5–10.1)
CHLORIDE SERPL-SCNC: 108 MMOL/L (ref 98–112)
CO2 SERPL-SCNC: 27 MMOL/L (ref 21–32)
CREAT BLD-MCNC: 2.25 MG/DL
DEPRECATED RDW RBC AUTO: 42.9 FL (ref 35.1–46.3)
EOSINOPHIL # BLD AUTO: 0.01 X10(3) UL (ref 0–0.7)
EOSINOPHIL NFR BLD AUTO: 0.1 %
ERYTHROCYTE [DISTWIDTH] IN BLOOD BY AUTOMATED COUNT: 13.8 % (ref 11–15)
GLOBULIN PLAS-MCNC: 4.4 G/DL (ref 2.8–4.4)
GLUCOSE BLD-MCNC: 132 MG/DL (ref 70–99)
HCT VFR BLD AUTO: 30.1 %
HGB BLD-MCNC: 9.9 G/DL
IMM GRANULOCYTES # BLD AUTO: 0.06 X10(3) UL (ref 0–1)
IMM GRANULOCYTES NFR BLD: 0.4 %
INR BLD: 1.14 (ref 0.89–1.11)
LACTATE SERPL-SCNC: 1.5 MMOL/L (ref 0.4–2)
LYMPHOCYTES # BLD AUTO: 0.54 X10(3) UL (ref 1–4)
LYMPHOCYTES NFR BLD AUTO: 3.6 %
M PROTEIN MFR SERPL ELPH: 7.3 G/DL (ref 6.4–8.2)
MCH RBC QN AUTO: 28.3 PG (ref 26–34)
MCHC RBC AUTO-ENTMCNC: 32.9 G/DL (ref 31–37)
MCV RBC AUTO: 86 FL
MONOCYTES # BLD AUTO: 0.93 X10(3) UL (ref 0.1–1)
MONOCYTES NFR BLD AUTO: 6.2 %
NEUTROPHILS # BLD AUTO: 13.5 X10 (3) UL (ref 1.5–7.7)
NEUTROPHILS # BLD AUTO: 13.5 X10(3) UL (ref 1.5–7.7)
NEUTROPHILS NFR BLD AUTO: 89.3 %
NT-PROBNP SERPL-MCNC: 1394 PG/ML (ref ?–450)
OSMOLALITY SERPL CALC.SUM OF ELEC: 303 MOSM/KG (ref 275–295)
PLATELET # BLD AUTO: 225 10(3)UL (ref 150–450)
POTASSIUM SERPL-SCNC: 4.7 MMOL/L (ref 3.5–5.1)
PROCALCITONIN SERPL-MCNC: 0.66 NG/ML (ref ?–0.16)
PSA SERPL DL<=0.01 NG/ML-MCNC: 15 SECONDS (ref 12.4–14.6)
RBC # BLD AUTO: 3.5 X10(6)UL
SARS-COV-2 RNA RESP QL NAA+PROBE: NOT DETECTED
SODIUM SERPL-SCNC: 140 MMOL/L (ref 136–145)
TROPONIN I SERPL-MCNC: <0.045 NG/ML (ref ?–0.04)
WBC # BLD AUTO: 15.1 X10(3) UL (ref 4–11)

## 2021-03-06 PROCEDURE — 71045 X-RAY EXAM CHEST 1 VIEW: CPT | Performed by: EMERGENCY MEDICINE

## 2021-03-06 PROCEDURE — 99232 SBSQ HOSP IP/OBS MODERATE 35: CPT | Performed by: INTERNAL MEDICINE

## 2021-03-06 RX ORDER — LEVOFLOXACIN 500 MG/1
500 TABLET, FILM COATED ORAL DAILY
Status: ON HOLD | COMMUNITY
Start: 2021-03-02 | End: 2021-03-06

## 2021-03-06 RX ORDER — HEPARIN SODIUM 5000 [USP'U]/ML
5000 INJECTION, SOLUTION INTRAVENOUS; SUBCUTANEOUS EVERY 12 HOURS
Status: DISCONTINUED | OUTPATIENT
Start: 2021-03-06 | End: 2021-03-09

## 2021-03-06 RX ORDER — ONDANSETRON 2 MG/ML
4 INJECTION INTRAMUSCULAR; INTRAVENOUS EVERY 4 HOURS PRN
Status: DISCONTINUED | OUTPATIENT
Start: 2021-03-06 | End: 2021-03-06 | Stop reason: HOSPADM

## 2021-03-06 RX ORDER — ALBUTEROL SULFATE 2.5 MG/3ML
10 SOLUTION RESPIRATORY (INHALATION) ONCE
Status: COMPLETED | OUTPATIENT
Start: 2021-03-06 | End: 2021-03-06

## 2021-03-06 RX ORDER — ALBUTEROL SULFATE 90 UG/1
2 AEROSOL, METERED RESPIRATORY (INHALATION) 4 TIMES DAILY
Status: DISCONTINUED | OUTPATIENT
Start: 2021-03-06 | End: 2021-03-09

## 2021-03-06 RX ORDER — AMLODIPINE BESYLATE 5 MG/1
5 TABLET ORAL DAILY
Status: DISCONTINUED | OUTPATIENT
Start: 2021-03-06 | End: 2021-03-09

## 2021-03-06 RX ORDER — ASPIRIN 81 MG/1
81 TABLET, CHEWABLE ORAL DAILY
Status: DISCONTINUED | OUTPATIENT
Start: 2021-03-06 | End: 2021-03-09

## 2021-03-06 RX ORDER — PRAVASTATIN SODIUM 20 MG
20 TABLET ORAL NIGHTLY
Status: DISCONTINUED | OUTPATIENT
Start: 2021-03-06 | End: 2021-03-09

## 2021-03-06 RX ORDER — FINASTERIDE 5 MG/1
5 TABLET, FILM COATED ORAL DAILY
Status: DISCONTINUED | OUTPATIENT
Start: 2021-03-06 | End: 2021-03-09

## 2021-03-06 RX ORDER — GUAIFENESIN 100 MG/5ML
100 SOLUTION ORAL EVERY 6 HOURS PRN
COMMUNITY

## 2021-03-06 RX ORDER — MULTIPLE VITAMINS W/ MINERALS TAB 9MG-400MCG
1 TAB ORAL DAILY
Status: DISCONTINUED | OUTPATIENT
Start: 2021-03-06 | End: 2021-03-09

## 2021-03-06 RX ORDER — MELATONIN
325 2 TIMES DAILY WITH MEALS
Status: DISCONTINUED | OUTPATIENT
Start: 2021-03-06 | End: 2021-03-09

## 2021-03-06 RX ORDER — SENNOSIDES 8.6 MG
8.6 TABLET ORAL 2 TIMES DAILY PRN
Status: DISCONTINUED | OUTPATIENT
Start: 2021-03-06 | End: 2021-03-09

## 2021-03-06 RX ORDER — IPRATROPIUM BROMIDE AND ALBUTEROL SULFATE 2.5; .5 MG/3ML; MG/3ML
3 SOLUTION RESPIRATORY (INHALATION) ONCE
Status: COMPLETED | OUTPATIENT
Start: 2021-03-06 | End: 2021-03-06

## 2021-03-06 RX ORDER — GUAIFENESIN 600 MG
600 TABLET, EXTENDED RELEASE 12 HR ORAL 2 TIMES DAILY
Status: DISCONTINUED | OUTPATIENT
Start: 2021-03-06 | End: 2021-03-09

## 2021-03-06 RX ORDER — ESCITALOPRAM OXALATE 5 MG/1
5 TABLET ORAL DAILY
Status: DISCONTINUED | OUTPATIENT
Start: 2021-03-06 | End: 2021-03-09

## 2021-03-06 RX ORDER — ACETAMINOPHEN 325 MG/1
650 TABLET ORAL EVERY 6 HOURS PRN
Status: DISCONTINUED | OUTPATIENT
Start: 2021-03-06 | End: 2021-03-09

## 2021-03-06 RX ORDER — ACETAMINOPHEN 160 MG
2000 TABLET,DISINTEGRATING ORAL DAILY
Status: DISCONTINUED | OUTPATIENT
Start: 2021-03-06 | End: 2021-03-09

## 2021-03-06 NOTE — CONSULTS
Yohannes Reid [address] [facility] [address] Note [facility]  Report of Consultation    Que Weaver Patient Status:  Inpatient    1926 MRN [id number]   [facility] 3NE-A Attending Manuel Nurse surgery testis     Family History   Problem Relation Age of Onset   • Cancer Father         laryngeal/throat   • Stroke Mother         stroke syndrome   • Other (Other) Brother         cutaneous gangrene      reports that he has never smoked.  He has never deformity or scars, no tenderness to palpation  PULMONARY: rhonchi bilaterally. No wheeze. no distress on 3L  COR: RRR no m  GI: NABS X 4, S/NT/ND, No hernias or HSM  LYMPHATIC: No palpable or visible lymphadenopathy in neck, axillae, groin  MSK: Normal str DO Claudia on 3/06/2021 at 7:43 AM     Finalized by (CST): Britton Hashimoto, DO on 3/06/2021 at 7:44 AM       Chest imaging reviewed which shows no changes from previous with no focalopacity or effusion    ASSESSMENT    · Possible pneumonia, at risk for aspirat

## 2021-03-06 NOTE — ED PROVIDER NOTES
Patient Seen in: [facility] Emergency Department      History   No chief complaint on file.     Stated Complaint: VOMINTING    HPI/Subjective:   HPI    29-year-old male with past medical history of dementia, hypertension, hyperlipidemia, coronary dis systems are as noted in HPI. Constitutional and vital signs reviewed. All other systems reviewed and negative except as noted above.     Physical Exam     ED Triage Vitals   BP    Pulse    Resp    Temp    Temp src    SpO2    O2 Device        Current:T albuterol treatment. He was found to have a leukocytosis of 15,000. Lactic acid and troponin were negative. Patient's creatinine was 2.25, however baseline appears to be 1.9. X-ray without acute consolidation, however patient clinically has pneumonia.

## 2021-03-06 NOTE — PHYSICAL THERAPY NOTE
PT order recieved under functional mobility  and chart reviewed.  Pt is a LTC resident from [facility] and has been admitted recently on 1/21/21 with a c/o respiratory distress and was seen by PT with quick eval citing that pt is at his baseline level of funct

## 2021-03-06 NOTE — PROGRESS NOTES
NURSING ADMISSION NOTE      Patient admitted via Cart  Oriented to room. Safety precautions initiated. Bed in low position. Call light in reach. Admission navigator completed. A&Ox1. Received on 5L O2 weaned to 2L with O2 sat 96%.  Strong conges

## 2021-03-06 NOTE — PLAN OF CARE
Pt is alert to self, very pleasant and cooperative, cough noted with talking, audible wheezing- VSS on baseline O2 of 2-3L  IV abx for poss PNA   Speech cleared to resume modified diet, assist with feeds   Mobility at baseline   Nephrology and Pulmonology transportation as appropriate  - Identify discharge learning needs (meds, wound care, etc)  - Arrange for interpreters to assist at discharge as needed  - Consider post-discharge preferences of patient/family/discharge partner  - Complete POLST form as arun

## 2021-03-06 NOTE — SLP NOTE
ADULT SWALLOWING EVALUATION    ASSESSMENT    ASSESSMENT/OVERALL IMPRESSION:  Pt seen this AM for bedside swallow evaluation. RN approved session.  Pt admitted to hospital from NH due to c/o fever, hypoxia, and symptoms of pneumonia with concern for aspirati May need to consider repeat VFSS if concern for aspiration continues. RECOMMENDATIONS   Diet Recommendations - Solids: Puree  Diet Recommendations - Liquid: Honey thick    Compensatory Strategies Recommended: No straws; Slow rate;Small bites and sips; Mul atelectasis is stable.  Overall, there is no significant interval change identified.        OBJECTIVE   ORAL MOTOR EXAMINATION  Dentition: Natural (sparse/missing teeth- no partial plates)  Symmetry: Within Functional Limits  Strength:  Within Functional

## 2021-03-06 NOTE — CONSULTS
[facility]  Report of Consultation    Adela Burrows Patient Status:  Inpatient    1926 MRN [id number]   [facility] 3NE-A Attending Courtney Norton MD   Hosp Day # 0 PCP Dagoberot Cross MD       Assessment / Plan:    1) CKD 4- bas • Retention of urine, unspecified    • Weakness      Past Surgical History:   Procedure Laterality Date   • CABG  1/1/2010    x4   • CATARACT      surgery right 9/2010 and Left 2/2011   • OTHER SURGICAL HISTORY  1/1/1959    left inguinal hernia repair file.      Review of Systems:  Please see HPI for pertinent positives. 10 point review of systems otherwise reviewed and negative.      Physical Exam:  /69 (BP Location: Left arm)   Pulse 92   Temp 97.9 °F (36.6 °C) (Axillary)   Resp 22   Wt 134 lb (6

## 2021-03-06 NOTE — H&P
[facility]    History & Physical    Tyler Matta Patient Status:  Inpatient    1926 MRN [id number]   [facility] 3NE-A Attending Adelaide Thornton MD   Hosp Day # 0 PCP Maxime Wallis MD     Date:  3/6/2021  Date of Admission:  3/ repair   • OTHER SURGICAL HISTORY     • OTHER SURGICAL HISTORY  3/30/2010    PTCA   • OTHER SURGICAL HISTORY      surgery testis     Family History   Problem Relation Age of Onset   • Cancer Father         laryngeal/throat   • Stroke Mother         stroke 139/69, pulse 92, temperature 97.9 °F (36.6 °C), temperature source Axillary, resp. rate 22, weight 134 lb (60.8 kg), SpO2 96 %.        OBJECTIVE:  Temp:  [97.6 °F (36.4 °C)-97.9 °F (36.6 °C)] 97.9 °F (36.6 °C)  Pulse:  [92-96] 92  Resp:  [22-25] 22  BP: (1 3/06/2021 at 7:43 AM     Finalized by (CST): Sunil Rashid DO on 3/06/2021 at 7:44 AM       EKG 12-LEAD    Result Date: 3/6/2021  Sinus tachycardia Left anterior fascicular block Possible Anterior infarct , age undetermined Abnormal ECG When compared with problem     Weakness     Fever     Weakness generalized     Fever, unspecified fever cause     Anemia     Vomiting     Vomiting, intractability of vomiting not specified, presence of nausea not specified, unspecified vomiting type     Pneumonia due to infe Certification    Patient will require inpatient services that will reasonably be expected to span two midnight's based on the clinical documentation in H+P.    Based on patients current state of illness, I anticipate that, after discharge, patient will requ

## 2021-03-07 LAB
ALBUMIN SERPL-MCNC: 2.6 G/DL (ref 3.4–5)
ALBUMIN/GLOB SERPL: 0.6 {RATIO} (ref 1–2)
ALP LIVER SERPL-CCNC: 84 U/L
ALT SERPL-CCNC: 14 U/L
ANION GAP SERPL CALC-SCNC: 5 MMOL/L (ref 0–18)
AST SERPL-CCNC: 22 U/L (ref 15–37)
ATRIAL RATE: 104 BPM
ATRIAL RATE: 65 BPM
BASOPHILS # BLD AUTO: 0.06 X10(3) UL (ref 0–0.2)
BASOPHILS NFR BLD AUTO: 0.6 %
BILIRUB SERPL-MCNC: 0.4 MG/DL (ref 0.1–2)
BUN BLD-MCNC: 37 MG/DL (ref 7–18)
BUN/CREAT SERPL: 19.3 (ref 10–20)
CALCIUM BLD-MCNC: 9.6 MG/DL (ref 8.5–10.1)
CHLORIDE SERPL-SCNC: 110 MMOL/L (ref 98–112)
CO2 SERPL-SCNC: 27 MMOL/L (ref 21–32)
CREAT BLD-MCNC: 1.92 MG/DL
DEPRECATED RDW RBC AUTO: 45.1 FL (ref 35.1–46.3)
EOSINOPHIL # BLD AUTO: 0.32 X10(3) UL (ref 0–0.7)
EOSINOPHIL NFR BLD AUTO: 3 %
ERYTHROCYTE [DISTWIDTH] IN BLOOD BY AUTOMATED COUNT: 14.2 % (ref 11–15)
GLOBULIN PLAS-MCNC: 4.2 G/DL (ref 2.8–4.4)
GLUCOSE BLD-MCNC: 88 MG/DL (ref 70–99)
HCT VFR BLD AUTO: 29.6 %
HGB BLD-MCNC: 9.4 G/DL
IMM GRANULOCYTES # BLD AUTO: 0.07 X10(3) UL (ref 0–1)
IMM GRANULOCYTES NFR BLD: 0.7 %
LYMPHOCYTES # BLD AUTO: 0.88 X10(3) UL (ref 1–4)
LYMPHOCYTES NFR BLD AUTO: 8.2 %
M PROTEIN MFR SERPL ELPH: 6.8 G/DL (ref 6.4–8.2)
MCH RBC QN AUTO: 27.6 PG (ref 26–34)
MCHC RBC AUTO-ENTMCNC: 31.8 G/DL (ref 31–37)
MCV RBC AUTO: 86.8 FL
MONOCYTES # BLD AUTO: 0.83 X10(3) UL (ref 0.1–1)
MONOCYTES NFR BLD AUTO: 7.7 %
NEUTROPHILS # BLD AUTO: 8.55 X10 (3) UL (ref 1.5–7.7)
NEUTROPHILS # BLD AUTO: 8.55 X10(3) UL (ref 1.5–7.7)
NEUTROPHILS NFR BLD AUTO: 79.8 %
OSMOLALITY SERPL CALC.SUM OF ELEC: 302 MOSM/KG (ref 275–295)
P AXIS: 38 DEGREES
P-R INTERVAL: 136 MS
P-R INTERVAL: 192 MS
PLATELET # BLD AUTO: 223 10(3)UL (ref 150–450)
POTASSIUM SERPL-SCNC: 4.3 MMOL/L (ref 3.5–5.1)
Q-T INTERVAL: 342 MS
Q-T INTERVAL: 440 MS
QRS DURATION: 86 MS
QRS DURATION: 92 MS
QTC CALCULATION (BEZET): 449 MS
QTC CALCULATION (BEZET): 457 MS
R AXIS: -52 DEGREES
R AXIS: 97 DEGREES
RBC # BLD AUTO: 3.41 X10(6)UL
SODIUM SERPL-SCNC: 142 MMOL/L (ref 136–145)
T AXIS: 66 DEGREES
T AXIS: 7 DEGREES
VENTRICULAR RATE: 104 BPM
VENTRICULAR RATE: 65 BPM
WBC # BLD AUTO: 10.7 X10(3) UL (ref 4–11)

## 2021-03-07 PROCEDURE — 99232 SBSQ HOSP IP/OBS MODERATE 35: CPT | Performed by: INTERNAL MEDICINE

## 2021-03-07 NOTE — PLAN OF CARE
Assumed care at [address] [facility]. Pt is A&Ox1 to self only, pleasantly confused and forgetful. On 3L of 02 NC, strong nonproductive cough when patient starts to talk, lung sounds clear. 02 sats above 90.  Pt denies any SOB and does not have signs of respiratory dis

## 2021-03-07 NOTE — PROGRESS NOTES
[facility] Pharmacy Note:  Renal Adjustment for piperacillin/tazobactam (Brenda Cunningham)    Hector Yao is a 80year old patient who has been prescribed piperacillin/tazobactam (ZOSYN) 3.375 gm IV every 8 hrs.   The estimated creatinine clearance is 16.6 mL/min (A) (b

## 2021-03-07 NOTE — PROGRESS NOTES
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 TP 6.8 03/07/2021    AST 22 03/07/2021    ALT 14 03/07/2021       Imaging: All imaging studies reviewed.     Meds:   azithromycin (ZITHROMAX) 500 mg in sodium chloride 0.9% 250 mL IVPB, 500 mg, Intravenous, Once  Piperacillin Sod-Tazobactam So (ZOSYN) 3.37

## 2021-03-07 NOTE — SLP NOTE
SPEECH DAILY NOTE - INPATIENT    ASSESSMENT & PLAN   ASSESSMENT  Pt seen this afternoon for meal monitor to ensure toleration of recommended diet and pt education re: aspiration precautions/compensatory strategies. No family was present.  Nurse approved Betzy Patel implementation of aspiration precautions and swallow strategies independently over 2-3 session(s).   In Progress     FOLLOW UP  Follow Up Needed: Yes  SLP Follow-up Date: 03/08/21  Number of Visits to Meet Established Goals: 3    Session: 1    If you have a

## 2021-03-07 NOTE — CM/SW NOTE
10:30am  MSW called Vlad Rebolledo and confirmed pt is from the long term nursing home. Updates sent in [facility].

## 2021-03-07 NOTE — PROGRESS NOTES
[facility] Pulmonary/Critical Care Progress Note     SUBJECTIVE/Interval history: All events, procedures, notes reviewed. No acute events overnight, he feels 'fine' today, thinks cough is improved. Denies dyspnea, pain. CO2 27.0     Recent Labs   Lab 03/06/21  0115 03/07/21  0702   RBC 3.50* 3.41*   HGB 9.9* 9.4*   HCT 30.1* 29.6*   MCV 86.0 86.8   MCH 28.3 27.6   MCHC 32.9 31.8   RDW 13.8 14.2   NEPRELIM 13.50* 8.55*   WBC 15.1* 10.7   .0 223.0     No results fo BID   • piperacillin-tazobactam  3.375 g Intravenous Q8H     acetaminophen, Senna    Chest imaging reviewed which shows no changes from previous with no focalopacity or effusion   no new imaging    ASSESSMENT     · Possible pneumonia, at risk for aspiratio

## 2021-03-08 LAB
ALBUMIN SERPL-MCNC: 2.4 G/DL (ref 3.4–5)
ALBUMIN/GLOB SERPL: 0.6 {RATIO} (ref 1–2)
ALP LIVER SERPL-CCNC: 76 U/L
ALT SERPL-CCNC: 14 U/L
ANION GAP SERPL CALC-SCNC: 4 MMOL/L (ref 0–18)
AST SERPL-CCNC: 22 U/L (ref 15–37)
BASOPHILS # BLD AUTO: 0.06 X10(3) UL (ref 0–0.2)
BASOPHILS NFR BLD AUTO: 0.8 %
BILIRUB SERPL-MCNC: 0.4 MG/DL (ref 0.1–2)
BUN BLD-MCNC: 33 MG/DL (ref 7–18)
BUN/CREAT SERPL: 19.1 (ref 10–20)
CALCIUM BLD-MCNC: 9.1 MG/DL (ref 8.5–10.1)
CHLORIDE SERPL-SCNC: 109 MMOL/L (ref 98–112)
CO2 SERPL-SCNC: 28 MMOL/L (ref 21–32)
CREAT BLD-MCNC: 1.73 MG/DL
DEPRECATED RDW RBC AUTO: 44.9 FL (ref 35.1–46.3)
EOSINOPHIL # BLD AUTO: 0.37 X10(3) UL (ref 0–0.7)
EOSINOPHIL NFR BLD AUTO: 5 %
ERYTHROCYTE [DISTWIDTH] IN BLOOD BY AUTOMATED COUNT: 13.8 % (ref 11–15)
GLOBULIN PLAS-MCNC: 4.1 G/DL (ref 2.8–4.4)
GLUCOSE BLD-MCNC: 85 MG/DL (ref 70–99)
HCT VFR BLD AUTO: 28.7 %
HGB BLD-MCNC: 9 G/DL
IMM GRANULOCYTES # BLD AUTO: 0.04 X10(3) UL (ref 0–1)
IMM GRANULOCYTES NFR BLD: 0.5 %
LYMPHOCYTES # BLD AUTO: 0.95 X10(3) UL (ref 1–4)
LYMPHOCYTES NFR BLD AUTO: 12.9 %
M PROTEIN MFR SERPL ELPH: 6.5 G/DL (ref 6.4–8.2)
MCH RBC QN AUTO: 27.5 PG (ref 26–34)
MCHC RBC AUTO-ENTMCNC: 31.4 G/DL (ref 31–37)
MCV RBC AUTO: 87.8 FL
MONOCYTES # BLD AUTO: 0.64 X10(3) UL (ref 0.1–1)
MONOCYTES NFR BLD AUTO: 8.7 %
NEUTROPHILS # BLD AUTO: 5.33 X10 (3) UL (ref 1.5–7.7)
NEUTROPHILS # BLD AUTO: 5.33 X10(3) UL (ref 1.5–7.7)
NEUTROPHILS NFR BLD AUTO: 72.1 %
OSMOLALITY SERPL CALC.SUM OF ELEC: 299 MOSM/KG (ref 275–295)
PLATELET # BLD AUTO: 224 10(3)UL (ref 150–450)
POTASSIUM SERPL-SCNC: 4.2 MMOL/L (ref 3.5–5.1)
RBC # BLD AUTO: 3.27 X10(6)UL
SODIUM SERPL-SCNC: 141 MMOL/L (ref 136–145)
WBC # BLD AUTO: 7.4 X10(3) UL (ref 4–11)

## 2021-03-08 PROCEDURE — 99232 SBSQ HOSP IP/OBS MODERATE 35: CPT | Performed by: INTERNAL MEDICINE

## 2021-03-08 NOTE — PROGRESS NOTES
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 03/08/2021    AST 22 03/08/2021    ALT 14 03/08/2021       Imaging: All imaging studies reviewed.     Meds:   Piperacillin Sod-Tazobactam So (ZOSYN) 3.375 g in dextrose 5% 100 mL IVPB-ADDV, 3.375 g, Intravenous, Q12H  azithromycin (ZITHROMAX) 500 mg in sod

## 2021-03-08 NOTE — PLAN OF CARE
Assumed care at [address] [facility]. Pt is A&O x1 to self only, pleasantly confused and forgetful. Was able to wean 02 to 2L of 02 NC, strong nonproductive cough. Lung sounds clear. 02 sats above 90. No signs of respiratory distress, receives chest physiotherapy QID.  NSR

## 2021-03-08 NOTE — PROGRESS NOTES
[facility] Pulmonary/Critical Care Progress Note     SUBJECTIVE/Interval history: All events, procedures, notes reviewed. Pt denies cp or sob.  Comfortable on room air    Review of Systems:   A comprehensive 14 point rev CO2 27.0 27.0 28.0     Recent Labs   Lab 03/06/21  0115 03/07/21  0702 03/08/21  0531   RBC 3.50* 3.41* 3.27*   HGB 9.9* 9.4* 9.0*   HCT 30.1* 29.6* 28.7*   MCV 86.0 86.8 87.8   MCH 28.3 27.6 27.5   MCHC 32.9 31.8 31.4   RDW 13.8 14.2 13.8   NEPRELIM 13. Units Subcutaneous Q12H   • Albuterol Sulfate HFA  2 puff Inhalation QID   • guaiFENesin ER  600 mg Oral BID     acetaminophen, Senna    ASSESSMENT    · Possible pneumonia, at risk for aspiration  · Acute hypoxia due to above  · Cough, dyspnea due to above

## 2021-03-08 NOTE — OCCUPATIONAL THERAPY NOTE
OCCUPATIONAL THERAPY                       OT order received through Functional Mobility screen. Confirmed with staff at [address] [place] [address],[address] that the patient receives assistance with all ADLs including close supervision for feeding.  He is non-ambulatory and has

## 2021-03-08 NOTE — CM/SW NOTE
9:50am  MSW spoke to [facility] Nick Sofia Marrero, he confirmed plan is for pt to return to [address] [place] [address] at discharge.

## 2021-03-08 NOTE — PROGRESS NOTES
[facility]  Progress Note    Brookline Anjma Patient Status:  Inpatient    1926 MRN [id number]   [facility] 3NE-A Attending Palmer Rodriguez MD   Hosp Day # 2 PCP Román Cabrera MD         SUBJECTIVE:  Subjective:  Brookline Najma 37* 33*   CREATSERUM 2.25* 1.92* 1.73*   CA 9.4 9.6 9.1   * 88 85       Recent Labs   Lab 03/06/21  0115 03/07/21  0702 03/08/21  0531   ALT 19 14* 14*   AST 22 22 22   ALB 2.9* 2.6* 2.4*       No results for input(s): PGLU in the last 168 hours. sulfate  325 mg Oral BID with meals   • escitalopram  5 mg Oral Daily   • Vitamin D3  2,000 Units Oral Daily   • Heparin Sodium (Porcine)  5,000 Units Subcutaneous Q12H   • Albuterol Sulfate HFA  2 puff Inhalation QID   • guaiFENesin ER  600 mg Oral BID Sepsis due to pneumonia ([facility] [address]    Acute hypoxemic respiratory failure (HCC)          Plan:  Continue present management,   Aspiration pneumonia, unspecified aspiration pneumonia type, unspecified laterality, unspecified part of lung (HCC)  IV antibiotics, o

## 2021-03-09 VITALS
RESPIRATION RATE: 18 BRPM | DIASTOLIC BLOOD PRESSURE: 59 MMHG | HEART RATE: 67 BPM | SYSTOLIC BLOOD PRESSURE: 137 MMHG | OXYGEN SATURATION: 96 % | BODY MASS INDEX: 26 KG/M2 | TEMPERATURE: 98 F | WEIGHT: 134 LBS

## 2021-03-09 LAB
ALBUMIN SERPL-MCNC: 2.5 G/DL (ref 3.4–5)
ALBUMIN/GLOB SERPL: 0.6 {RATIO} (ref 1–2)
ALP LIVER SERPL-CCNC: 79 U/L
ALT SERPL-CCNC: 14 U/L
ANION GAP SERPL CALC-SCNC: 6 MMOL/L (ref 0–18)
AST SERPL-CCNC: 24 U/L (ref 15–37)
BASOPHILS # BLD AUTO: 0.06 X10(3) UL (ref 0–0.2)
BASOPHILS NFR BLD AUTO: 1 %
BILIRUB SERPL-MCNC: 0.4 MG/DL (ref 0.1–2)
BUN BLD-MCNC: 27 MG/DL (ref 7–18)
BUN/CREAT SERPL: 17.2 (ref 10–20)
CALCIUM BLD-MCNC: 9.7 MG/DL (ref 8.5–10.1)
CHLORIDE SERPL-SCNC: 108 MMOL/L (ref 98–112)
CO2 SERPL-SCNC: 27 MMOL/L (ref 21–32)
CREAT BLD-MCNC: 1.57 MG/DL
DEPRECATED RDW RBC AUTO: 42.9 FL (ref 35.1–46.3)
EOSINOPHIL # BLD AUTO: 0.34 X10(3) UL (ref 0–0.7)
EOSINOPHIL NFR BLD AUTO: 5.5 %
ERYTHROCYTE [DISTWIDTH] IN BLOOD BY AUTOMATED COUNT: 13.8 % (ref 11–15)
GLOBULIN PLAS-MCNC: 4.3 G/DL (ref 2.8–4.4)
GLUCOSE BLD-MCNC: 88 MG/DL (ref 70–99)
HCT VFR BLD AUTO: 30.8 %
HGB BLD-MCNC: 10 G/DL
IMM GRANULOCYTES # BLD AUTO: 0.06 X10(3) UL (ref 0–1)
IMM GRANULOCYTES NFR BLD: 1 %
LYMPHOCYTES # BLD AUTO: 0.94 X10(3) UL (ref 1–4)
LYMPHOCYTES NFR BLD AUTO: 15.3 %
M PROTEIN MFR SERPL ELPH: 6.8 G/DL (ref 6.4–8.2)
MCH RBC QN AUTO: 27.5 PG (ref 26–34)
MCHC RBC AUTO-ENTMCNC: 32.5 G/DL (ref 31–37)
MCV RBC AUTO: 84.8 FL
MONOCYTES # BLD AUTO: 0.59 X10(3) UL (ref 0.1–1)
MONOCYTES NFR BLD AUTO: 9.6 %
NEUTROPHILS # BLD AUTO: 4.16 X10 (3) UL (ref 1.5–7.7)
NEUTROPHILS # BLD AUTO: 4.16 X10(3) UL (ref 1.5–7.7)
NEUTROPHILS NFR BLD AUTO: 67.6 %
OSMOLALITY SERPL CALC.SUM OF ELEC: 297 MOSM/KG (ref 275–295)
PLATELET # BLD AUTO: 219 10(3)UL (ref 150–450)
POTASSIUM SERPL-SCNC: 4.1 MMOL/L (ref 3.5–5.1)
RBC # BLD AUTO: 3.63 X10(6)UL
SODIUM SERPL-SCNC: 141 MMOL/L (ref 136–145)
WBC # BLD AUTO: 6.2 X10(3) UL (ref 4–11)

## 2021-03-09 PROCEDURE — 99232 SBSQ HOSP IP/OBS MODERATE 35: CPT | Performed by: INTERNAL MEDICINE

## 2021-03-09 NOTE — CM/SW NOTE
Expected Discharge Plan:    Destination: Subacute [address] [facility]  Call for report to: [phone].   Transportation provider-[facility]   DC Time: on will [id number]  Pt/Family aware of plan: MSW called Mr. Wolf Mckenna and updated him on possibl

## 2021-03-09 NOTE — PLAN OF CARE
Assumed patient care @242  Droplet enhanced precautions, from [place]. Pat's  Patient is AOx1-2: self and location, not alert to time or situation, confused  On room air  NSR on tele, , vss, afebrile  Chronic alvarado in place  Last BM 3/8  Patient denies pain schedule  Outcome: Progressing     Problem: DISCHARGE PLANNING  Goal: Discharge to home or other facility with appropriate resources  Description: INTERVENTIONS:  - Identify barriers to discharge w/pt and caregiver  - Include patient/family/discharge partn

## 2021-03-09 NOTE — PROGRESS NOTES
[facility]  Progress Note    Slime Orosco Patient Status:  Inpatient    1926 MRN [id number]   [facility] 3NE-A Attending Kaleb Perez MD   Hosp Day # 3 PCP Cathleen Maynard MD         SUBJECTIVE:  Subjective:  Slime Orosco 9. 4* 9.0* 10.0*   MCV 86.0 86.8 87.8 84.8   .0 223.0 224.0 219.0   INR 1.14*  --   --   --        Recent Labs   Lab 03/06/21  0115 03/07/21  0702 03/08/21  0531 03/09/21  0559    142 141 141   K 4.7 4.3 4.2 4.1    110 109 108   CO2 27.0 Oral Nightly   • aspirin  81 mg Oral Daily   • multivitamin with minerals  1 tablet Oral Daily   • amLODIPine Besylate  5 mg Oral Daily   • finasteride  5 mg Oral Daily   • Oxymetazoline HCl  2 spray Nasal TID   • ferrous sulfate  325 mg Oral BID with meal Acute hypoxemic respiratory failure (HCC)     Stage 3 chronic kidney disease    Principal Problem:    Aspiration pneumonia, unspecified aspiration pneumonia type, unspecified laterality, unspecified part of lung ([facility] [address]  Active Problems:    Aspiration pneumo

## 2021-03-09 NOTE — PROGRESS NOTES
I called and gave report to nurse Carloz Saunders at [address] [facility]. Patient's physical and history were relayed to nursing staff and included past medical history, admitting diagnosis of Aspiration Pneumonia.  Patient will be picked up via Ambulance

## 2021-03-09 NOTE — PROGRESS NOTES
[facility]  Nephrology Progress Note    Anthony Obando Attending:  Zbigniew Louis MD       Assessment and Plan:    1) CKD 4- baseline Cr approx 2 mg/dl due to hypertensive + age related nephrosclerosis; previous eval for other etiologies unrevealin BILT 0.4 03/09/2021    TP 6.8 03/09/2021    AST 24 03/09/2021    ALT 14 03/09/2021       Imaging: All imaging studies reviewed.     Meds:   guaiFENesin (ROBITUSSIN) 100mg/5ml LIQUID 100 mg, 100 mg, Oral, Q6H PRN  Piperacillin Sod-Tazobactam So (ZOSYN) 3.37

## 2021-03-09 NOTE — PROGRESS NOTES
[facility] Pulmonary/Critical Care Progress Note     SUBJECTIVE/Interval history: All events, procedures, notes reviewed. Pt feels well and denies cp or sob.     Review of Systems:   A comprehensive 14 point review of sy 03/08/21  0531 03/09/21  0559   RBC 3.41* 3.27* 3.63*   HGB 9.4* 9.0* 10.0*   HCT 29.6* 28.7* 30.8*   MCV 86.8 87.8 84.8   MCH 27.6 27.5 27.5   MCHC 31.8 31.4 32.5   RDW 14.2 13.8 13.8   NEPRELIM 8.55* 5.33 4.16   WBC 10.7 7.4 6.2   .0 224.0 219.0 dementia  · HTN, HLD    PLAN    · BC NGTD chidi 3  · xosyn day 4 today  · Agree with discharge  · D/w rn  · Cr improved.  Renal following ckd stage 4      Maggie Shelton MD

## 2021-03-09 NOTE — SLP NOTE
SPEECH DAILY NOTE - INPATIENT    ASSESSMENT & PLAN   ASSESSMENT  Pt seen for dysphagia tx to assess tolerance with recommended diet, ensure proper utilization of aspiration precautions and provide pt/family education.   Patient currently on a pureed diet an over 2-3 session(s). In Progress   Goal #3 The patient will tolerate trial upgrade of ground consistency and honey-thick liquids without overt signs or symptoms of aspiration with 90 % accuracy over 1-2 session(s).   In Progress   FOLLOW UP  Follow Up Ne

## 2021-03-09 NOTE — PLAN OF CARE
Assumed care at 7:30 am. Patient alert to baseline and resting in bed w/bed alarm on and call light w/in reach. Cardiac monitoring. RA. SCDs on. PIV abx running per orders. SLP to re-eval, see notes. No c/o pain/sob noted. Patient to discharge today.   H

## 2021-03-09 NOTE — PLAN OF CARE
Assumed care @ 0730. Alert to self. Calm, cooperative. VSS. Denies pain. Weaned off O2. SpO2 mid 90's on room air. Non-productive cough. Chest PT per RT. IVABX administered as ordered. NSR on telemetry. Tolerating pureed/NTL diet. Denies nausea/vomiting.  C Arrange for needed discharge resources and transportation as appropriate  - Identify discharge learning needs (meds, wound care, etc)  - Arrange for interpreters to assist at discharge as needed  - Consider post-discharge preferences of patient/family/disc

## 2021-03-12 NOTE — DISCHARGE PLANNING
[facility]  Discharge Summary    [id number] Patient Status:  Inpatient    1926 MRN [id number]   [facility] 3NE-A Attending No att. providers found   [address] Day # 3 PCP Shante Ang MD     Date of Admission: 3/6/2021    Date o pneumonia ([facility] [address]     Acute hypoxemic respiratory failure ([facility] [address]     Stage 3 chronic kidney disease    Hospital Encounter on 03/06/21   1.  BLOOD CULTURE     Status: None    Collection Time: 03/06/21  1:00 AM    Specimen: Blood,peripheral   Result Value Ref Ra Precautions: Upright position; Slow rate;Small bites and sips   Medication Administration Recommendations: One pill at a time; Whole in puree   OBJECTIVE:   Temp: [97.8 °F (36.6 °C)-99 °F (37.2 °C)] 97.8 °F (36.6 °C)   Pulse: [63-88] 88   Resp: [16-18] 18 TECHNIQUE: AP chest radiograph was obtained. COMPARISON: [facility] , XR, XR CHEST AP PORTABLE (CPT=71045), 1/21/2021, 7:10 PM. INDICATIONS: Patient presents with emesis.  PATIENT STATED HISTORY: (As transcribed by Technologist) Pt. with difficulty breathing an Anemia   Vomiting   Vomiting, intractability of vomiting not specified, presence of nausea not specified, unspecified vomiting type   Pneumonia due to infectious organism, unspecified laterality, unspecified part of lung   Hypernatremia   Essential hyper Recommendations: One pill at a time; Whole in puree   Sepsis due to pneumonia ([facility] [address]   IV antibiotics, fluids, ID and pulmonary follow-up   Acute hypoxemic respiratory failure (HCC)   Oxygenation, treat underlying pneumonia, aspiration precaution, supportive Tab  Take 1 tablet by mouth daily. , Historical    PEG 3350 17 g Oral Powd Pack  Take 17 g by mouth daily as needed., Historical    Senna 8.6 MG Oral Tab  Take 8.6 mg by mouth 2 (two) times daily as needed (for constipation).   , Historical    Pravastatin So

## 2021-03-18 NOTE — DISCHARGE SUMMARY
[facility]  Discharge Summary    Armida Osorio LEBJGOF Patient Status:  Inpatient    1926 MRN [id number]   [facility] 3NE-A Attending No att. providers found   [facility] Day # 3 PCP Amy Graham MD     Date of Admission: 3/6/2021    Date o pneumonia ([facility] [address]     Acute hypoxemic respiratory failure ([facility] [address]     Stage 3 chronic kidney disease    Hospital Encounter on 03/06/21   1.  BLOOD CULTURE     Status: None    Collection Time: 03/06/21  1:00 AM    Specimen: Blood,peripheral   Result Value Ref Ra General Appearance:  Alert, cooperative, no distress, appears stated age   Head:  Normocephalic, atraumatic   Neck: Supple, symmetrical, trachea midline,   thyroid:   no JVD   Lungs:  Clear to auscultation bilaterally, respirations unlabored       Heart: DO Claudia on 3/06/2021 at 7:43 AM Finalized by (CST): Eris Donnelly DO on 3/06/2021 at 7:44 AM   Meds:   • azithromycin 500 mg Intravenous Once   • piperacillin-tazobactam 3.375 g Intravenous Once   • Pravastatin Sodium 20 mg Oral Nightly   • aspirin 81 mg Aspiration pneumonia (HCC)   Aspiration pneumonia, unspecified aspiration pneumonia type, unspecified laterality, unspecified part of lung ([facility] [address]   Sepsis due to pneumonia ([facility] [address]   Ac[facility]     Principal Problem:   Aspirati Solution  Take 100 mg by mouth every 6 (six) hours as needed., Historical    Oxymetazoline HCl 0.05 % Nasal Solution  2 sprays by Nasal route 3 (three) times daily.   , Historical    ferrous sulfate 325 (65 FE) MG Oral Tab EC  Take 325 mg by mouth 2 (two) t

## 2023-05-01 NOTE — PROGRESS NOTES
[facility]  Progress Note    Layla Brandon Patient Status:  Inpatient    1926 MRN [id number]   [facility] 5NW-A Attending Meagan Pepe MD   Hosp Day # 3 PCP Juan M Samaniego MD         SUBJECTIVE:  Subjective:  Layla Dudleyk[facility]  [address] [facility]  [address]  [place] [address]  [phone]        14* 22   AST 20 18 23 36   ALB 3.1* 2.9* 2.7* 2.9*     --   --   --        No results for input(s): PGLU in the last 168 hours. No results for input(s): URINE, CULTI, BLDSMR in the last 168 hours. Hospital Encounter on 01/21/21   1.  URINE CULT creams the day of the exam.         IV,NO ORAL)(CPT=74176), 11/04/2020, 12:09 PM.  INDICATIONS:  uti sepsis  TECHNIQUE:  Unenhanced multislice CT scanning from above the kidneys to below the urinary bladder.   2D rendering are generated on the CT scanner workstation to localize potential ston wall thickening present. Consider correlation for cystitis. CONCLUSION:  No evidence of renal stones or hydronephrosis. There is a Leo catheter within the urinary bladder which is decompressed, limiting evaluation.   There is probable bladd acetaminophen, acetaminophen, ondansetron HCl       Assessment/Plan:   Patient Active Problem List:     Other abnormal glucose     Coronary atherosclerosis of unspecified type of vessel, native or graft     Generalized and unspecified atherosclerosis     I failure with hypoxia–resolved, on room air, monitor for now, pulm follow up        Urinary tract infection without hematuria, site unspecified  History of enterococcus infection, elevated PCT, leukocytosis, history of urinary retention, hydronephrosis, a r

## (undated) NOTE — IP AVS SNAPSHOT
[address]            (For Outpatient Use Only) Initial Admit Date: 3/6/2021   Inpt/Obs Admit Date: Inpt: 3/6/21 / Obs: N/A   Discharge Date:    Charlotte Badillo:  [de-identified]   MRN: [de-identified]   CSN: [id number]   CEID: [id number] Pt Rel to Subscriber: SELF   TERTIARY INSURANCE   Payor:  Plan:    Group Number:  Insurance Type:    Subscriber Name:  Subscriber :    Subscriber ID:  Pt Rel to Subscriber:    Hospital Account Financial Class: Medicare    2021

## (undated) NOTE — IP AVS SNAPSHOT
[address]            (For Outpatient Use Only) Initial Admit Date: 1/21/2021   Inpt/Obs Admit Date: Inpt: 1/21/21 / Obs: N/A   Discharge Date:    Carylon Kawasaki:  [de-identified]   MRN: [de-identified]   CSN: [id number]   CEID: [id number] TERTIARY INSURANCE   Payor:  Plan:    Group Number:  Insurance Type:    Subscriber Name:  Subscriber :    Subscriber ID:  Pt Rel to Subscriber:    Hospital Account Financial Class: Medicare    2021

## (undated) NOTE — IP AVS SNAPSHOT
Patient Demographics     Address  [facility]  [address]  [place] [address] Phone  [phone] [facility]  [phone] [facility] E-mail Address  [email or web address]      Emergency Contact(s)     Name Relation Home Work Mobile Take 17 g by mouth daily as needed. Pravastatin Sodium 20 MG Tabs  Commonly known as: PRAVACHOL  Next dose due: 1/26 AM      Take 20 mg by mouth daily.           Senna 8.6 MG Tabs  Commonly known as: SENOKOT      Take 8.6 mg by mouth 2 (two) times SpO2  94 % Filed at 01/25/2021 0500      Patient's Most Recent Weight       Most Recent Value   Patient Weight  54.8 kg (120 lb 14.4 oz)      CPAP Settings (Inpatient)       Most Recent Value   Mode  Spontaneous   Interface  Full face mask   Mask size  Med Ordering provider: Elena Magdaleno MD  01/24/21 7216 Resulting lab: [facility])   Narrative: The following orders were created for panel order CBC WITH DIFFERENTIAL WITH PLATELET.   Procedure                               A Coronavirus Nl63 PCR: Negative     Coronavirus Oc43 PCR: Negative     Metapneumovirus PCR: Negative     Rhinovirus/Entero PCR: Negative     Influenza A PCR: Negative     Influenza B PCR: Negative     Parainfluenza 1 PCR: Negative     Parainlfuenza 2 PCR History provided by:patient/ ER MD/NOTES/ NH NOTES  Chief Complaint:   Patient presents with:  Difficulty Breathing  Cough/URI  Fever      HPI:   Jose Chang is a(n) 80year old male.      40-year-old male known to me from [facility] wi Social History    Tobacco Use      Smoking status: Never Smoker      Smokeless tobacco: Never Used    Alcohol use: No    Drug use: No    Allergies/Medications:    Allergies:   Toprol Xl [Metoprol*        Comment:XL TB24; lung congestion    •  Oxymetazoline General Appearance:    Alert, cooperative, no distress, appears stated age   Head:    Normocephalic,  atraumatic   Neck:   Supple, symmetrical, trachea midline,        thyroid:  No enlargement/tenderness; no  JVD   Lungs:     Clear to auscultation bilatera Baseline wander Normal sinus rhythm Left axis deviation Nonspecific intraventricular block Abnormal ECG When compared with ECG of 04-NOV-2020 11:08, QRS duration has increased Nonspecific T wave abnormality has replaced inverted T waves in Anterior leads N Weakness     Fever     Weakness generalized     Fever, unspecified fever cause     Anemia     Vomiting     Vomiting, intractability of vomiting not specified, presence of nausea not specified, unspecified vomiting type     Pneumonia due to infectious or Based on patients current state of illness, I anticipate that, after discharge, patient will require TBD.       WEAKNESS- PT OT EVAL AND TREAT  DISPOSITION PER     See tests ordered,  Available and radiology reviewed  All consultant notes revie Past Medical History:   Diagnosis Date   • Atherosclerotic heart disease of native coronary artery without angina pectoris    • BPH (benign prostatic hyperplasia)    • Cellulitis and abscess of unspecified site    • Chest pain, unspecified    • Coronary at •  Acetaminophen ER (ACETAMINOPHEN 8 HOUR) 650 MG Oral Tab CR, Take 650 mg by mouth every 6 (six) hours as needed for Pain. •  Multiple Vitamins-Minerals (MULTI-VITAMIN/MINERALS) Oral Tab, Take 1 tablet by mouth daily.     •  PEG 3350 17 g Oral Powd Pack .0 01/22/2021    CREATSERUM 2.35 (H) 01/22/2021    BUN 48 (H) 01/22/2021     01/22/2021    K 4.5 01/22/2021     01/22/2021    CO2 29.0 01/22/2021     (H) 01/22/2021    CA 9.4 01/22/2021    ALB 2.9 (L) 01/22/2021    ALKPHO 79 01/2 CONCLUSION:    Nonspecific patchy mixed interstitial and airspace opacities upper and lower right lung and lower left lung. Edema versus atypical pneumonia. Cardiomegaly with left ventricular prominence. Vascularity mildly congested.   Tortuous ectatic Possibly due to pneumonia, treat underlying infection, IV antibiotics, oxygenation          Pneumonia due to COVID-19 virus  IV antibiotics, ID and pulmonary follow-up    Acute respiratory failure with hypoxia–Decadron, oxygenation per pulmonary    Per ID Related Notes: Original Note by Teresa Albright MD (Physician) filed at 1/22/2021 10:34 AM     Consult Orders    1. Consult to Infectious Disease [[id number]] ordered by Leila Casey MD at 01/21/21 1920    2.  ED Consult to Infectious Disease [[id number] • OTHER SURGICAL HISTORY  1/1/1959    left inguinal hernia repair   • OTHER SURGICAL HISTORY     • OTHER SURGICAL HISTORY  3/30/2010    PTCA   • OTHER SURGICAL HISTORY      surgery testis     Family History   Problem Relation Age of Onset   • Cancer Father •  finasteride 5 MG Oral Tab, Take 1 tablet (5 mg total) by mouth daily. , Disp: 90 tablet, Rfl: 5    •  Acetaminophen ER (ACETAMINOPHEN 8 HOUR) 650 MG Oral Tab CR, Take 650 mg by mouth every 6 (six) hours as needed for Pain., Disp: , Rfl:     •  Multiple V ALKPHO 89  --  79   AST 20  --  18   ALT 15*  --  15*   BILT 0.2  --  0.4   TP 7.7  --  7.2     COVID-19 Lab Results    COVID-19[EDVIN.1]  Lab Results   Component Value Date    COVID19 Not Detected 01/21/2021    COVID19 Detected (A) 12/31/2020    COVID19 Not Vomiting, intractability of vomiting not specified, presence of nausea not specified, unspecified vomiting type     Pneumonia due to infectious organism, unspecified laterality, unspecified part of lung     Hypernatremia     Essential hypertension     E Location [facility] 5NW-A Attending Iqra Enamorado MD   [facility] Day # 4 PCP Corinna Kamara MD     Date of Admission: 1/21/2021    Date of Discharge: 1/25/2021    Admitting Diagnosis: Acute respiratory distress [R06.03]  Urinary tract infection without h PROCEDURE:  XR VIDEO SWALLOW (CPT=74230)  TECHNIQUE:  Video fluoroscopic swallowing study was performed in cooperation with the speech pathologist.  Barium of varying consistencies was administered orally with patient in lateral projection.   COMPARISON:  N PROCEDURE:  CT ABDOMEN+PELVIS KIDNEYSTONE 2D RNDR(NO IV,NO ORAL)(CPT=74176)  COMPARISON:  [facility] , CT, CT ABDOMEN+PELVIS KIDNEYSTONE 2D RNDR(NO IV,NO ORAL)(CPT=74176), 11/04/2020, 12:09 PM.  INDICATIONS:  uti sepsis  TECHNIQUE:  Unenhanced multislice CT sc present. There is a Leo catheter within the urinary bladder which is decompressed, limiting evaluation. However there is probable bladder wall thickening present. Consider correlation for cystitis.               CONCLUSION:  No evidence of renal stones Result Value Ref Range    Blood Culture Result No Growth 3 Days N/A     Reason for Admission: see below    Physical Exam: see below    Hospital Course: see below    SUBJECTIVE:  Subjective:  Luisito Ware is a(n) 80year old male.   Awake, alert , sang AST 20 18 23 36 30   ALB 3.1* 2.9* 2.7* 2.9* 2.8*     --   --   --   --          No results for input(s): PGLU in the last 168 hours.     No results for input(s): URINE, CULTI, BLDSMR in the last 168 hours.     Recent Results         [facility] present. There is a Leo catheter within the urinary bladder which is decompressed, limiting evaluation. However there is probable bladder wall thickening present.   Consider correlation for cystitis.               CONCLUSION:  No evidence of renal stone • albuterol sulfate  10 mg Nebulization Once   • Ipratropium Bromide  0.5 mg Nebulization Once   • Albuterol Sulfate HFA  8 puff Inhalation Q4H [facility]      influenza virus vaccine PF, PEG 3350, acetaminophen, acetaminophen, ondansetron HCl         Assessment/ Possibly due to pneumonia, treat underlying infection, IV antibiotics, oxygenation-Improved           Pneumonia due to COVID-19 virus  IV antibiotics, ID and pulmonary follow-up  Pt on room air, monitoring off decadron, covid infection likely old from [facility] Qty: 90 tablet Refills: 5    Acetaminophen ER (ACETAMINOPHEN 8 HOUR) 650 MG Oral Tab CR  Take 650 mg by mouth every 6 (six) hours as needed for Pain. Multiple Vitamins-Minerals (MULTI-VITAMIN/MINERALS) Oral Tab  Take 1 tablet by mouth daily.     PEG 3350 • Atherosclerotic heart disease of native coronary artery without angina pectoris    • BPH (benign prostatic hyperplasia)    • Cellulitis and abscess of unspecified site    • Chest pain, unspecified    • Coronary atherosclerosis    • DDD (degenerative disc -   Turning over in bed (including adjusting bedclothes, sheets and blankets)?: A Little   -   Sitting down on and standing up from a chair with arms (e.g., wheelchair, bedside commode, etc.): A Little   -   Moving from lying on back to sitting on the side Patient was able to transfer At previous, functional level   Patient able to ambulate on level surfaces At previous, functional level[AE.1]        Attribution Key    AE. 1 - Dariela Green, PT on 1/22/2021  1:32 PM  AE. 2 - Sim Aguilar, PT on 1/22/2021 • [facility]    • Dysphagia, oropharyngeal    • Essential hypertension    • High blood pressure    • High cholesterol    • History of falling    • Obstructive and reflux uropathy, unspecified    • Other and unspecified hyperlipidemia    • Personal hist How much help from another person does the patient currently need…[BJ.1]  -   Putting on and taking off regular lower body clothing?: A Lot   -   Bathing (including washing, rinsing, drying)?: A Lot  -   Toileting, which includes using toilet, bedpan or ur Specific performance deficits impacting engagement in ADL/IADL  LOW   Client Assessment/Performance Deficits  LOW   Clinical Decision Making  LOW   Overall Complexity  LOW[BJ.1]     OT Discharge Recommendations: Long term care  OT Device Recommendations: N Medication Administration Recommendations: One pill at a time; Whole in puree;Crushed in pure[LK.2]e      Overall Impression: Moderate Oropharyngeal Dysphagia 2/2 decreased laryngeal elevation, slowed epiglottic inversion, delayed initiation of pharyngeal s • High cholesterol    • History of falling    • Obstructive and reflux uropathy, unspecified    • Other and unspecified hyperlipidemia    • Personal history of other diseases of circulatory system    • Retention of urine, unspecified    • Weakness        C Penetration Aspiration Scale Score: Score 7: Material enters the airway, passes below the vocal folds, and is not ejected from the trachea despite effort[LK. 2]       GOALS  Goal #1 The patient will tolerate puree consistency and honey-thick liquids without Author: PAMELA Bardales Service: Rehab Author Type: Speech Pathologist    Filed: 1/22/2021 12:38 PM Date of Service: 1/22/2021 11:15 AM Status: Signed    : PAMELA Bardales (Speech Pathologist)       ADULT SWALLOWING EVALUATION    ASSESSMENT Recommend puree diet with nectar thick liquids via 1:1 feeding supervision. Position Pt upright prior to any PO intake. Furthermore, recommend VFSS to further assess oropharyngeal function of swallow and r/o aspiration.   Additional recommendations pending Prior Living Situation: Skilled nursing facility  Diet Prior to Admission: Puree;Nectar thick liquids; Honey thick liquids       Patient/Family Goals: \"yes\"    SWALLOWING HISTORY  Current Diet Consistency: NPO    Dysphagia History: as per above    Imaging Interdisciplinary Progressing    Description: Patient's Long Term Goal: to discharge with adequate resources      Interventions:  - Participate in and comply with medical treatment plan  - See additional Care Plan goals for specific interventions   Giulia

## (undated) NOTE — IP AVS SNAPSHOT
[facility] [place] [address] [place], [address] ~ [phone]                Discharge Summary   2/23/2017    Vanessa Reyes [facility]           Admission Information        Provider Department    2/23/2017 Conrado Almendarez MD  [facility]    Contact information:    Sadie Villatoro [address] [facility] [address] [place] [address]  [phone]          Follow up with Michael Lira MD In 1 month.     Specialty:  UROLOGY    Contact information:    [facility] 87 [address] [facility] [phone] - If you have concerns related to behavioral health issues or thoughts of harming yourself, contact [address] [place] [address] at [phone].     - If you don’t have insurance, [facility] has partnered with [employer] What to report to your healthcare team:  Dizziness, nausea, chest pain, weakness, numbness           Cholesterol Lowering Medications     Colestipol HCl 1 g Oral Tab    Pravastatin Sodium 20 MG Oral Tab       Use: Lower cholesterol, protect your heart   Mo

## (undated) NOTE — IP AVS SNAPSHOT
Patient Demographics     Address  [facility]  [address]  [place] [address] Phone  [phone] [facility]  [phone] [facility] E-mail Address  [email or web address]      Emergency Contact(s)     Name Relation Home Work Mobile Chew 81 mg by mouth daily. escitalopram 5 MG Tabs  Commonly known as: LEXAPRO  Next dose due: Wednesday morning      Take 5 mg by mouth daily.           ferrous sulfate 325 (65 FE) MG Tbec  Next dose due: Tuesday with dinner      Take 325 mg by john inhaler 2 puff 03/09/21 1309 Given      [id number] Piperacillin Sod-Tazobactam So (ZOSYN) 3.375 g in dextrose 5% 100 mL IVPB-ADDV 03/08/21 2038 New Bag      [id number] Piperacillin Sod-Tazobactam So (ZOSYN) 3.375 g in dextrose 5% 100 mL IVPB-ADDV 03/09/21 08 1308   Resp  18 Filed at 03/09/2021 1308   Temp  98.2 °F (36.8 °C) Filed at 03/09/2021 1308   SpO2  96 % Filed at 03/09/2021 1308      Patient's Most Recent Weight       Most Recent Value   Patient Weight  60.8 kg (134 lb)         Lab Results Last 24 Hours American 37 >=60 L [facility])   GFR, -American 43 >=60 L [facility] ([facility])   AST 24 15 - 37 U/L L[facility])   ALT 14 16 - 61 U/L L [facility])   Alkaline Phosphatase 79 45 - 117 U/L — [facility])   Bilirubin, Total 0.4 0.1 - 2. Gisella Patient Status:  Inpatient    1926 MRN [id number]   [facility] 3NE-A Attending Zbigniew Louis MD   Hosp Day # 0 PCP Darlene Banerjee MD     Date:  3/6/2021  Date of Admission:  3/6/2021    History provided by:patient/ ER MD/NOTES HISTORY  3/30/2010    PTCA   • OTHER SURGICAL HISTORY      surgery testis     Family History   Problem Relation Age of Onset   • Cancer Father         laryngeal/throat   • Stroke Mother         stroke syndrome   • Other (Other) Brother         cutaneous ga temperature source Axillary, resp. rate 22, weight 134 lb (60.8 kg), SpO2 96 %.        OBJECTIVE:  Temp:  [97.6 °F (36.4 °C)-97.9 °F (36.6 °C)] 97.9 °F (36.6 °C)  Pulse:  [92-96] 92  Resp:  [22-25] 22  BP: (110-143)/(63-72) 139/69    Intake/Output:  No [place] DO Claudia on 3/06/2021 at 7:44 AM       EKG 12-LEAD    Result Date: 3/6/2021  Sinus tachycardia Left anterior fascicular block Possible Anterior infarct , age undetermined Abnormal ECG When compared with ECG of 21-JAN-2021 20:19, Vent.  rate has decreased B Fever, unspecified fever cause     Anemia     Vomiting     Vomiting, intractability of vomiting not specified, presence of nausea not specified, unspecified vomiting type     Pneumonia due to infectious organism, unspecified laterality, unspecified part reasonably be expected to span two midnight's based on the clinical documentation in H+P. Based on patients current state of illness, I anticipate that, after discharge, patient will require TBD.       WEAKNESS- PT OT EVAL AND TREAT  DISPOSITION PER ART coughing soon after questioning.  No recorded fevers[EK.2]    History:  Past Medical History:   Diagnosis Date   • Atherosclerotic heart disease of native coronary artery without angina pectoris    • BPH (benign prostatic hyperplasia)    • Cellulitis and ab • Heparin Sodium (Porcine)  5,000 Units Subcutaneous Q12H     acetaminophen, Senna    Review of Systems:[EK.1] limited due to dementia[EK.2]  Vital signs in last 24 hours:  Patient Vitals for the past 24 hrs:   BP Temp Temp src Pulse Resp SpO2 Weight   03 Labs   Lab 03/06/21  0115   INR 1.14*   PTT 34.4       Other Labs:[EK.1]   COVID-19 Lab Results    COVID-19[EK.2]  Lab Results   Component Value Date    COVID19 Not Detected 03/06/2021    COVID19 Not Detected 01/21/2021    COVID19 Detected (A) 12/31/2020[E HSQ  Dispo: floor[EK. 2]    Thank you for the consultation. Will follow with you. Reymundo Lao MD  SLA[EK.1]      Electronically signed by Dylon Acevedo MD on 3/6/2021  2:59 PM   Attribution Perez    EK. 1 - Dylon Acevedo MD on 3/6/2021  2:12 PM Signed    : Antonio Hebert OT (Occupational Therapist)                   OCCUPATIONAL THERAPY                       OT order received through Functional Mobility screen.  Confirmed with staff at [facility] that the patient receives assistance with all ADL to limit bolus size and rate of intake as patient is somewhat impulsive. SLP will continue to follow to monitor diet tolerance following upgrade.     Diet Recommendations - Solids: Mechanical soft ground  Diet Recommendations - Liquid: Honey thick    Compen Disciplines Outcome Interventions   Patient/Family Long Term Goal     Interdisciplinary Progressing    Description: Patient's Long Term Goal: discharge back to [facility]     Interventions:  - See additional Care Plan goals for specific interventions   Giulia

## (undated) NOTE — IP AVS SNAPSHOT
[address]            (For Outpatient Use Only) Initial Admit Date: 11/4/2020   Inpt/Obs Admit Date: Inpt: 11/5/20 / Obs: 11/04/20   Discharge Date:    Asa Andres:  [de-identified]   MRN: [de-identified]   CSN: [id number]   CEID: [id number] Subscriber ID: [id number] Pt Rel to Subscriber: SELF   TERTIARY INSURANCE   Payor:  Plan:    Group Number:  Insurance Type:    Subscriber Name:  Subscriber :    Subscriber ID:  Pt Rel to Subscriber:    Hospital Account Financial Class: Medicare    Nov

## (undated) NOTE — IP AVS SNAPSHOT
Patient Demographics     Address  [facility]  [address]  [place] [address] Phone  [phone] [facility]  [phone] [facility] E-mail Address  [email or web address]      Emergency Contact(s)     Name Relation Home Work Mobile Acetaminophen 8 Hour 650 MG Tbcr  Generic drug: Acetaminophen ER      Take 650 mg by mouth every 6 (six) hours as needed for Pain.           amLODIPine Besylate 5 MG Tabs  Commonly known as: NORVASC  Start taking on: November 10, 2020  Next dose due: Krysten [id number] Albuterol Sulfate  (90 Base) MCG/ACT inhaler 2 puff 11/08/20 2235 Given      [id number] Albuterol Sulfate  (90 Base) MCG/ACT inhaler 2 puff 11/09/20 0833 Given      [id number] PEG 3350 (MIRALAX) powder packet 17 g 11/08/20 2234 Given Chloride 108 98 - 112 mmol/L — [facility])   CO2 25.0 21.0 - 32.0 mmol/L — [facility])   Anion Gap 7 0 - 18 mmol/L — [facility] ([facility])   BUN 41 7 - 18 mg/dL [facility])   Creatinine 2.24 0.70 - 1.30 mg/dL [facility])   BUN/CREA Ratio Microbiology Results (All)     Procedure Component Value Units Date/Time    Blood Culture FREQ X 2 [[id number]] Collected: 11/04/20 1526    Order Status: Completed Lab Status: Final result Updated: 11/09/20 1600    Specimen: Blood,peripheral      Blood Cult 80-year-old man, history of CAD status post CABG, here for evaluation of multiple episodes of vomiting, reported some with coffee-ground type emesis. History is somewhat limited secondary to dementia.   Patient denies any abdominal pain denies any diarrhea •  aspirin 325 MG Oral Tab, Take 325 mg by mouth daily. •  AmLODIPine Besylate (NORVASC) 2.5 MG Oral Tab, Take 2.5 mg by mouth daily.       •  Acetaminophen ER (ACETAMINOPHEN 8 HOUR) 650 MG Oral Tab CR, Take 650 mg by mouth every 6 (six) hours as needed CA 9.2 11/05/2020    ALB 2.9 (L) 11/05/2020    ALKPHO 78 11/05/2020    BILT 0.5 11/05/2020    TP 6.8 11/05/2020    AST 26 11/05/2020    ALT 16 11/05/2020    INR 1.11 11/04/2020    LENCHO 118 (H) 11/03/2020     11/04/2020    PHOS 3.5 11/05/2020    TROP PROCEDURE:  CT ABDOMEN+PELVIS KIDNEYSTONE 2D RNDR(NO IV,NO ORAL)(CPT=74176)  COMPARISON:  None. INDICATIONS:  NVD  TECHNIQUE:  Unenhanced multislice CT scanning from above the kidneys to below the urinary bladder.   2D rendering are generated on the CT sca CONCLUSION:  1. There is moderate right hydronephrosis although there is no obstructing stone. 2. There is marked enlargement of prostate in distention of bladder suggesting bladder outlet obstruction.  3. Large amount of retained fecal debris and rectum co Other abnormal glucose     Coronary atherosclerosis of unspecified type of vessel, native or graft     Generalized and unspecified atherosclerosis     Intermediate coronary syndrome (HCC)     Unspecified disorder of kidney and ureter     Osteoarthrosis, Given 1 dose antibiotic, and nonspecific abnormality on chest x-ray, patient with some dyspnea, Continue oxygen,will consult pulmonary      Hypernatremia  Monitor hydration    Hydronephrosis–could be contributing to abdominal pain,  evaluation    Acute k Zainab Obando[RT. 2] is a a(n)[RT.3 80year old[RT.2] male nonsmoker resident of [facility] who presents with two days cough subsequent to vomiting. Hx obtained from [facility]. Pt has had ongoing constipation and developed vomiting  2.5 days ago.  Pt then developed Constitutional: Negative for anorexia, chills, fatigue, fevers, malaise, night sweats and weight loss. Eyes: Negative for visual disturbance, irritation and redness.   Ears, nose, mouth, throat, and face: Negative for hearing loss, tinnitus, nasal congesti HEENT: Atraumatic, normocephalic, EOMI, no icterus/hemorrhage, no conjunctival injection/discharge, nares normal  MOUTH: MMM, good dentition  NECK: Trachea midline, symmetric, no visible masses or scars, no crepitus, normal flexion/extension  CHEST: Normal PHURINE 6.0   PROUR Negative   UROBILINOGEN <2.0   NITRITE Negative   LEUUR Negative[RT. 2]       Radiology:[RT.1]     Ct Abdomen+pelvis Kidneystone 2d Rndr(no Iv,no Oral)(cpt=74176)    Result Date: 11/4/2020  CONCLUSION:  1.  There is moderate right hydrone RT.2 Nissa Ramirez MD on 11/5/2020  3:37 PM                     D/C Summary    No notes of this type exist for this encounter.      Physical Therapy Notes (last 72 hours) (Notes from 11/6/2020  4:05 PM through 11/9/2020  4:05 PM)    No notes of this typ Pt received awake, alert, pleasant and smiling. Pt repositioned to upright and midline position in bed. Noted baseline congested cough; nonproductive.  Pt with several missing upper teeth , essentially edentulous on top row, however bottom row teeth present • DDD (degenerative disc disease), cervical    • Other and unspecified hyperlipidemia    • Personal history of other diseases of circulatory system        Prior Living Situation: Skilled nursing facility  Diet Prior to Admission: Puree;Nectar thick liquids If you have any questions, please contact Javy Mario MS CCC-SLP/L, pager [phone]  Speech-LanguagePathologist[KIRSTEN.1]      Electronically signed by PAMELA Mccord on 11/5/2020  9:05 AM   Attribution Key    OVIDIO 1 - PAMELA Mccord on 11/5/2